# Patient Record
Sex: MALE | Race: WHITE | Employment: FULL TIME | URBAN - METROPOLITAN AREA
[De-identification: names, ages, dates, MRNs, and addresses within clinical notes are randomized per-mention and may not be internally consistent; named-entity substitution may affect disease eponyms.]

---

## 2020-10-09 ENCOUNTER — TRANSFERRED RECORDS (OUTPATIENT)
Dept: HEALTH INFORMATION MANAGEMENT | Facility: CLINIC | Age: 55
End: 2020-10-09

## 2021-04-26 ENCOUNTER — VIRTUAL VISIT (OUTPATIENT)
Dept: FAMILY MEDICINE | Facility: CLINIC | Age: 56
End: 2021-04-26
Payer: COMMERCIAL

## 2021-04-26 DIAGNOSIS — Z86.0100 HISTORY OF COLONIC POLYPS: ICD-10-CM

## 2021-04-26 DIAGNOSIS — Z86.718 PERSONAL HISTORY OF DVT (DEEP VEIN THROMBOSIS): Primary | ICD-10-CM

## 2021-04-26 DIAGNOSIS — Z86.711 HISTORY OF PULMONARY EMBOLISM: ICD-10-CM

## 2021-04-26 PROCEDURE — 99203 OFFICE O/P NEW LOW 30 MIN: CPT | Mod: TEL | Performed by: FAMILY MEDICINE

## 2021-04-26 RX ORDER — MULTIVITAMIN/IRON/FOLIC ACID 18MG-0.4MG
1 TABLET ORAL
COMMUNITY

## 2021-04-26 RX ORDER — GINSENG 250 MG
1 CAPSULE ORAL
COMMUNITY
End: 2022-10-18

## 2021-04-26 RX ORDER — WARFARIN SODIUM 2.5 MG/1
TABLET ORAL
COMMUNITY
Start: 2020-07-13 | End: 2021-06-15

## 2021-04-26 NOTE — PROGRESS NOTES
Vitor is a 55 year old who is being evaluated via a billable telephone visit.      What phone number would you like to be contacted at? [phone]  How would you like to obtain your AVS? Mail a copy    Assessment & Plan       ICD-10-CM    1. Personal history of DVT (deep vein thrombosis)  Z86.718 ANTICOAGULATION CLINIC REFERRAL   2. History of pulmonary embolism  Z86.711 ANTICOAGULATION CLINIC REFERRAL   3. History of colonic polyps  Z86.010      We will get him plugged into our INR clinic for ongoing INR monitoring  Continue same warfarin for now  I suggested an in person visit in a few months or so for a general physical and some fasting lab work (it looks like his last physical was in November 2019)    Return in about 3 months (around 7/26/2021) for Physical Exam, Lab Work, Routine Visit.    Odilon Corral MD  [facility]    Jose Adler is a 55 year old who presents for the following health issues :    HPI     New Patient/Transfer of Care.    He has been receiving care through the [facility] system, but would like to start coming here to our clinic.  His previous physician left and he knows some people who come here to our clinic and he would like to start fresh here.  He has a history of a left leg DVT in August 2015.  He was on warfarin for a while, but then stopped it.  He then developed a pulmonary embolism in March 2017 and was advised to go on lifelong anticoagulation.  He is taking warfarin as below and his INRs have generally been in the 2-3 range.  He does have a history of colon polyps and had a colonoscopy in October 2020 which was normal.  A repeat exam was recommended in 5 years given his history of adenomatous colon polyp.    Patient Active Problem List   Diagnosis     Personal history of DVT (deep vein thrombosis)     History of pulmonary embolism     History of colonic polyps     Current Outpatient Medications   Medication     garlic 150 MG TABS tablet      Frisian Ginseng 250 MG CAPS     warfarin ANTICOAGULANT (COUMADIN) 2.5 MG tablet     Multiple Vitamins-Minerals (CENTRUM ADULTS) TABS     No current facility-administered medications for this visit.          Review of Systems   Constitutional, HEENT, cardiovascular, pulmonary, gi and gu systems are negative, except as otherwise noted.      Objective           Vitals:  No vitals were obtained today due to virtual visit.    Physical Exam       PSYCH: Alert and oriented times 3; coherent speech, normal   rate and volume, able to articulate logical thoughts, able   to abstract reason, no tangential thoughts, no hallucinations   or delusions  His affect is normal  RESP: No cough, no audible wheezing, able to talk in full sentences  Remainder of exam unable to be completed due to telephone visits    Old records were reviewed via Care Everywhere.            Phone call duration: 11 minutes

## 2021-04-27 ENCOUNTER — ANTICOAGULATION THERAPY VISIT (OUTPATIENT)
Dept: FAMILY MEDICINE | Facility: CLINIC | Age: 56
End: 2021-04-27

## 2021-04-27 DIAGNOSIS — Z86.718 PERSONAL HISTORY OF DVT (DEEP VEIN THROMBOSIS): ICD-10-CM

## 2021-04-27 DIAGNOSIS — Z86.711 HISTORY OF PULMONARY EMBOLISM: ICD-10-CM

## 2021-04-27 NOTE — PROGRESS NOTES
Called to establish patient he has been on coumadin for 5 years changing systems. Reviewed our policy with him and phone numbers given.  Mohamud Pate Rn  [facility]

## 2021-05-25 ENCOUNTER — TELEPHONE (OUTPATIENT)
Dept: FAMILY MEDICINE | Facility: CLINIC | Age: 56
End: 2021-05-25

## 2021-05-25 NOTE — TELEPHONE ENCOUNTER
ANTICOAGULATION     Benedict Maldonado is overdue for INR check.      Spoke with benedict  and scheduled lab appointment on 6/2    Angela Silveira RN

## 2021-06-01 ENCOUNTER — ANTICOAGULATION THERAPY VISIT (OUTPATIENT)
Dept: FAMILY MEDICINE | Facility: CLINIC | Age: 56
End: 2021-06-01

## 2021-06-01 DIAGNOSIS — Z86.711 HISTORY OF PULMONARY EMBOLISM: ICD-10-CM

## 2021-06-01 DIAGNOSIS — Z86.718 PERSONAL HISTORY OF DVT (DEEP VEIN THROMBOSIS): ICD-10-CM

## 2021-06-01 LAB
CAPILLARY BLOOD COLLECTION: NORMAL
INR PPP: 2.8 (ref 0.86–1.14)

## 2021-06-01 PROCEDURE — 36416 COLLJ CAPILLARY BLOOD SPEC: CPT | Performed by: FAMILY MEDICINE

## 2021-06-01 PROCEDURE — 85610 PROTHROMBIN TIME: CPT | Performed by: FAMILY MEDICINE

## 2021-06-01 NOTE — PROGRESS NOTES
Anticoagulation Management    Unable to reach Vitor today.    Today's INR result of 2.8 is therapeutic (goal INR of 2.0-3.0).  Result received from: Clinic Lab    Follow up required to assess for changes     left message with dosign and instructions to call and make an inr apt in 6 weeks.      Anticoagulation clinic to follow up    Angela Silveira RN

## 2021-06-15 DIAGNOSIS — Z86.711 HISTORY OF PULMONARY EMBOLISM: Primary | ICD-10-CM

## 2021-06-15 RX ORDER — WARFARIN SODIUM 2.5 MG/1
TABLET ORAL
Qty: 110 TABLET | Refills: 1 | Status: SHIPPED | OUTPATIENT
Start: 2021-06-15 | End: 2021-11-16

## 2021-06-15 NOTE — TELEPHONE ENCOUNTER
Patient calling for a refill.     Prescription approved per [facility] Refill Protocol.    Francisca Godwin RN  [facility] Anticoagulation [facility]

## 2021-07-23 DIAGNOSIS — Z86.718 PERSONAL HISTORY OF DVT (DEEP VEIN THROMBOSIS): Primary | ICD-10-CM

## 2021-07-23 DIAGNOSIS — Z86.711 HISTORY OF PULMONARY EMBOLISM: ICD-10-CM

## 2021-07-26 ENCOUNTER — ANTICOAGULATION THERAPY VISIT (OUTPATIENT)
Dept: FAMILY MEDICINE | Facility: CLINIC | Age: 56
End: 2021-07-26

## 2021-07-26 ENCOUNTER — LAB (OUTPATIENT)
Dept: LAB | Facility: CLINIC | Age: 56
End: 2021-07-26
Payer: COMMERCIAL

## 2021-07-26 DIAGNOSIS — Z86.711 HISTORY OF PULMONARY EMBOLISM: ICD-10-CM

## 2021-07-26 DIAGNOSIS — Z86.718 PERSONAL HISTORY OF DVT (DEEP VEIN THROMBOSIS): ICD-10-CM

## 2021-07-26 DIAGNOSIS — Z86.718 PERSONAL HISTORY OF DVT (DEEP VEIN THROMBOSIS): Primary | ICD-10-CM

## 2021-07-26 LAB — INR BLD: 2.4 (ref 0.9–1.1)

## 2021-07-26 PROCEDURE — 36416 COLLJ CAPILLARY BLOOD SPEC: CPT

## 2021-07-26 PROCEDURE — 85610 PROTHROMBIN TIME: CPT

## 2021-07-26 NOTE — PROGRESS NOTES
ANTICOAGULATION MANAGEMENT     Vitor SANDERSON Erica 55 year old male is on warfarin with therapeutic INR result. (Goal INR 2.0-3.0)    Recent labs: (last 7 days)     07/26/21  1624   INR 2.4*       ASSESSMENT     Source(s): Chart Review and Patient/Caregiver Call       Warfarin doses taken: Warfarin taken as instructed    Diet: No new diet changes identified    New illness, injury, or hospitalization: No    Medication/supplement changes: None noted    Signs or symptoms of bleeding or clotting: No    Previous INR: Therapeutic last 2(+) visits    Additional findings: None     PLAN     Recommended plan for no diet, medication or health factor changes affecting INR     Dosing Instructions: Continue your current warfarin dose with next INR in 6 weeks       Summary  As of 7/26/2021    Full warfarin instructions:  5 mg every Sun, Wed; 2.5 mg all other days   Next INR check:  8/23/2021             Telephone call with Vitor who verbalizes understanding and agrees to plan    Lab visit scheduled    Education provided: None required and Please call back if any changes to your diet, medications or how you've been taking warfarin    Plan made per [facility] anticoagulation protocol    Angela Silveira RN  Anticoagulation Clinic  7/26/2021    _______________________________________________________________________     Anticoagulation Episode Summary     Current INR goal:  2.0-3.0   TTR:  100.0 % (1.8 mo)   Target end date:  1/1/2070   Send INR reminders to:  JESSICA TAPIA    Indications    Personal history of DVT (deep vein thrombosis) [Z86.718]  History of pulmonary embolism [Z86.711]           Comments:           Anticoagulation Care Providers     Provider Role Specialty Phone number    Odilon Corral MD Referring Family Medicine [phone]

## 2021-09-16 ENCOUNTER — ANTICOAGULATION THERAPY VISIT (OUTPATIENT)
Dept: ANTICOAGULATION | Facility: CLINIC | Age: 56
End: 2021-09-16

## 2021-09-16 ENCOUNTER — LAB (OUTPATIENT)
Dept: LAB | Facility: CLINIC | Age: 56
End: 2021-09-16
Payer: COMMERCIAL

## 2021-09-16 DIAGNOSIS — Z86.711 HISTORY OF PULMONARY EMBOLISM: ICD-10-CM

## 2021-09-16 DIAGNOSIS — Z86.718 PERSONAL HISTORY OF DVT (DEEP VEIN THROMBOSIS): ICD-10-CM

## 2021-09-16 DIAGNOSIS — Z86.718 PERSONAL HISTORY OF DVT (DEEP VEIN THROMBOSIS): Primary | ICD-10-CM

## 2021-09-16 LAB — INR BLD: 2.4 (ref 0.9–1.1)

## 2021-09-16 PROCEDURE — 36416 COLLJ CAPILLARY BLOOD SPEC: CPT

## 2021-09-16 PROCEDURE — 85610 PROTHROMBIN TIME: CPT

## 2021-09-16 NOTE — PROGRESS NOTES
Anticoagulation Management    Unable to reach Vitor today.    Today's INR result of 2.4 is therapeutic (goal INR of 2.0-3.0).  Result received from: Clinic Lab    Follow up required to confirm warfarin dose taken and assess for changes    Left message to continue current dose of warfarin 2.5 mg tonight.     Plan: continue maintenance dose and recheck INR in 6 weeks      Anticoagulation clinic to follow up    Portia Mackey RN

## 2021-09-17 NOTE — PROGRESS NOTES
ANTICOAGULATION MANAGEMENT     Vitor SANDERSON Tabbynasim 55 year old male is on warfarin with therapeutic INR result. (Goal INR 2.0-3.0)    Recent labs: (last 7 days)     09/16/21  1537   INR 2.4*       ASSESSMENT     Source(s): Chart Review       Warfarin doses taken: Warfarin taken as instructed    Diet: No new diet changes identified    New illness, injury, or hospitalization: No    Medication/supplement changes: None noted    Signs or symptoms of bleeding or clotting: No    Previous INR: Therapeutic last 2(+) visits    Additional findings: None     PLAN     Recommended plan for no diet, medication or health factor changes affecting INR     Dosing Instructions: Continue your current warfarin dose with next INR in 4 weeks       Summary  As of 9/16/2021    Full warfarin instructions:  5 mg every Sun, Wed; 2.5 mg all other days   Next INR check:  10/28/2021             Telephone call with Vitor who verbalizes understanding and agrees to plan    Lab visit scheduled    Education provided: Please call back if any changes to your diet, medications or how you've been taking warfarin    Plan made per [facility] anticoagulation protocol    Angela Silveira RN  Anticoagulation Clinic  9/17/2021    _______________________________________________________________________     Anticoagulation Episode Summary     Current INR goal:  2.0-3.0   TTR:  100.0 % (3.5 mo)   Target end date:  1/1/2070   Send INR reminders to:  JESSICA TAPIA    Indications    Personal history of DVT (deep vein thrombosis) [Z86.718]  History of pulmonary embolism [Z86.711]           Comments:           Anticoagulation Care Providers     Provider Role Specialty Phone number    Odilon Corral MD Referring Family Medicine [phone]

## 2021-10-22 ENCOUNTER — OFFICE VISIT (OUTPATIENT)
Dept: OPTOMETRY | Facility: CLINIC | Age: 56
End: 2021-10-22
Payer: COMMERCIAL

## 2021-10-22 DIAGNOSIS — H52.31 ANISOMETROPIA: ICD-10-CM

## 2021-10-22 DIAGNOSIS — Z01.01 ENCOUNTER FOR EXAMINATION OF EYES AND VISION WITH ABNORMAL FINDINGS: Primary | ICD-10-CM

## 2021-10-22 DIAGNOSIS — H52.4 PRESBYOPIA: ICD-10-CM

## 2021-10-22 DIAGNOSIS — H53.021 REFRACTIVE AMBLYOPIA OF RIGHT EYE: ICD-10-CM

## 2021-10-22 DIAGNOSIS — H52.223 REGULAR ASTIGMATISM OF BOTH EYES: ICD-10-CM

## 2021-10-22 DIAGNOSIS — H43.811 PVD (POSTERIOR VITREOUS DETACHMENT), RIGHT: ICD-10-CM

## 2021-10-22 DIAGNOSIS — H52.13 MYOPIA OF BOTH EYES: ICD-10-CM

## 2021-10-22 PROCEDURE — 92015 DETERMINE REFRACTIVE STATE: CPT | Performed by: OPTOMETRIST

## 2021-10-22 PROCEDURE — 92004 COMPRE OPH EXAM NEW PT 1/>: CPT | Performed by: OPTOMETRIST

## 2021-10-22 ASSESSMENT — VISUAL ACUITY
OS_CC: 20/20
OS_SC: 20/30
OS_CC+: -1
METHOD: SNELLEN - LINEAR
OD_SC: 20/30
CORRECTION_TYPE: GLASSES
OS_SC: 20/70
OD_PH_CC: 20/80
OD_SC: 20/80
OD_CC: 20/125

## 2021-10-22 ASSESSMENT — TONOMETRY
IOP_METHOD: APPLANATION
OS_IOP_MMHG: 13
OD_IOP_MMHG: 12

## 2021-10-22 ASSESSMENT — CONF VISUAL FIELD
OD_NORMAL: 1
OS_NORMAL: 1

## 2021-10-22 ASSESSMENT — REFRACTION_MANIFEST
OD_AXIS: 071
OS_ADD: +2.50
OS_SPHERE: -2.25
OS_AXIS: 145
OD_SPHERE: -5.25
OD_ADD: +2.50
OD_CYLINDER: +2.50
OS_CYLINDER: +0.50

## 2021-10-22 ASSESSMENT — CUP TO DISC RATIO
OD_RATIO: 0.35
OS_RATIO: 0.4

## 2021-10-22 ASSESSMENT — REFRACTION_WEARINGRX
OS_AXIS: 110
OS_SPHERE: -2.50
OS_CYLINDER: +1.00
OD_SPHERE: -6.25
OD_CYLINDER: +2.50
SPECS_TYPE: SVL
OD_AXIS: 060

## 2021-10-22 ASSESSMENT — EXTERNAL EXAM - LEFT EYE: OS_EXAM: NORMAL

## 2021-10-22 ASSESSMENT — SLIT LAMP EXAM - LIDS
COMMENTS: NORMAL
COMMENTS: NORMAL

## 2021-10-22 ASSESSMENT — EXTERNAL EXAM - RIGHT EYE: OD_EXAM: NORMAL

## 2021-10-22 NOTE — PATIENT INSTRUCTIONS
"You have a PVD- posterior vitreous detachment which is due to the gel of the eye shrinking and clumping together.  This can sometimes cause holes or tears in the retina.  The signs of a retinal detachment are flashes of light or a \"curtain veil\" coming over your vision. If you notice any of these changes return to clinic for re-evaluation.     Distance glasses prescription provided today, per patient request.   Allow 2 weeks to adapt to the new glasses.     Return in 1 year for a comprehensive eye exam, or sooner if needed.     The effects of the dilating drops last for 4- 6 hours.  You will be more sensitive to light and vision will be blurry up close.  Mydriatic sunglasses were given if needed.    Guillermo Quinonez, OD  [address] [place]  [address]    [phone]    "

## 2021-10-22 NOTE — PROGRESS NOTES
"Chief Complaint   Patient presents with     Annual Eye Exam         Last Eye Exam: 2017  Dilated Previously: Yes, side effects of dilation explained today    What are you currently using to see?  glasses       Distance Vision Acuity: Satisfied with vision    Near Vision Acuity: Satisfied with vision while reading and using computer unaided    Eye Comfort: good  Do you use eye drops? 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       OBJECTIVE: See Ophthalmology exam    ASSESSMENT:    ICD-10-CM    1. Encounter for examination of eyes and vision with abnormal findings  Z01.01    2. Refractive amblyopia of right eye  H53.021    3. Anisometropia  H52.31    4. PVD (posterior vitreous detachment), right  H43.811    5. Myopia of both eyes  H52.13    6. Regular astigmatism of both eyes  H52.223    7. Presbyopia  H52.4       PLAN:     Patient Instructions   You have a PVD- posterior vitreous detachment which is due to the gel of the eye shrinking and clumping together.  This can sometimes cause holes or tears in the retina.  The signs of a retinal detachment are flashes of light or a \"curtain veil\" coming over your vision. If you notice any of these changes return to clinic for re-evaluation.     Distance glasses prescription provided today, per patient request.   Allow 2 weeks to adapt to the new glasses.     Return in 1 year for a comprehensive eye exam, or sooner if needed.     The effects of the dilating drops last for 4- 6 hours.  You will be more sensitive to light and vision will be blurry up close.  Mydriatic sunglasses were given if needed.    Guillermo Quinonez, [address] [place]  [address]    [phone]       "

## 2021-10-22 NOTE — LETTER
"    10/22/2021         RE: Vitor Maldonado  [address]  [place] [address]        Dear Colleague,    Thank you for referring your patient, Vitor Maldonado, to the [facility]. Please see a copy of my visit note below.    Chief Complaint   Patient presents with     Annual Eye Exam         Last Eye Exam: 2017  Dilated Previously: Yes, side effects of dilation explained today    What are you currently using to see?  glasses       Distance Vision Acuity: Satisfied with vision    Near Vision Acuity: Satisfied with vision while reading and using computer unaided    Eye Comfort: good  Do you use eye drops? 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       OBJECTIVE: See Ophthalmology exam    ASSESSMENT:    ICD-10-CM    1. Encounter for examination of eyes and vision with abnormal findings  Z01.01    2. Refractive amblyopia of right eye  H53.021    3. Anisometropia  H52.31    4. PVD (posterior vitreous detachment), right  H43.811    5. Myopia of both eyes  H52.13    6. Regular astigmatism of both eyes  H52.223    7. Presbyopia  H52.4       PLAN:     Patient Instructions   You have a PVD- posterior vitreous detachment which is due to the gel of the eye shrinking and clumping together.  This can sometimes cause holes or tears in the retina.  The signs of a retinal detachment are flashes of light or a \"curtain veil\" coming over your vision. If you notice any of these changes return to clinic for re-evaluation.     Distance glasses prescription provided today, per patient request.   Allow 2 weeks to adapt to the new glasses.     Return in 1 year for a comprehensive eye exam, or sooner if needed.     The effects of the dilating drops last for 4- 6 hours.  You will be more sensitive to light and vision will be blurry up close.  Mydriatic sunglasses were given if needed.    Guillermo Quinonez, " ERICKA  [facility]  [address]. [place]  [address]    [phone]           Again, thank you for allowing me to participate in the care of your patient.        Sincerely,        Guillermo Quinonez OD

## 2021-11-09 ENCOUNTER — LAB (OUTPATIENT)
Dept: LAB | Facility: CLINIC | Age: 56
End: 2021-11-09
Payer: COMMERCIAL

## 2021-11-09 ENCOUNTER — ANTICOAGULATION THERAPY VISIT (OUTPATIENT)
Dept: ANTICOAGULATION | Facility: CLINIC | Age: 56
End: 2021-11-09

## 2021-11-09 DIAGNOSIS — Z86.718 PERSONAL HISTORY OF DVT (DEEP VEIN THROMBOSIS): Primary | ICD-10-CM

## 2021-11-09 DIAGNOSIS — Z86.711 HISTORY OF PULMONARY EMBOLISM: ICD-10-CM

## 2021-11-09 DIAGNOSIS — Z86.718 PERSONAL HISTORY OF DVT (DEEP VEIN THROMBOSIS): ICD-10-CM

## 2021-11-09 LAB — INR BLD: 3 (ref 0.9–1.1)

## 2021-11-09 PROCEDURE — 85610 PROTHROMBIN TIME: CPT

## 2021-11-09 PROCEDURE — 36415 COLL VENOUS BLD VENIPUNCTURE: CPT

## 2021-11-09 NOTE — PROGRESS NOTES
ANTICOAGULATION MANAGEMENT     Vitor Maldonado 56 year old male is on warfarin with therapeutic INR result. (Goal INR 2.0-3.0)    Recent labs: (last 7 days)     11/09/21  1506   INR 3.0*       ASSESSMENT     Source(s): Chart Review and Patient/Caregiver Call       Warfarin doses taken: Warfarin taken as instructed    Diet: No new diet changes identified    New illness, injury, or hospitalization: Yes: back pain from yard work    Medication/supplement changes: ibuprofen for back pain    Signs or symptoms of bleeding or clotting: No    Previous INR: Therapeutic last 2(+) visits    Additional findings: None     PLAN     Recommended plan for temporary change(s) affecting INR     Dosing Instructions: Continue your current warfarin dose with next INR in 6 weeks       Summary  As of 11/9/2021    Full warfarin instructions:  5 mg every Sun, Wed; 2.5 mg all other days   Next INR check:  12/21/2021             Telephone call with Vitor who verbalizes understanding and agrees to plan    Patient offered & declined to schedule next visit    Education provided: Please call back if any changes to your diet, medications or how you've been taking warfarin, Potential interaction between warfarin and ibuprofen with increased bleed risk, Monitoring for bleeding signs and symptoms and Contact [phone]  with any changes, questions or concerns.     Plan made per [facility] anticoagulation protocol    Delmi Calderon RN  Anticoagulation Clinic  11/9/2021    _______________________________________________________________________     Anticoagulation Episode Summary     Current INR goal:  2.0-3.0   TTR:  100.0 % (5.3 mo)   Target end date:  1/1/2070   Send INR reminders to:  JESSICA TAPIA    Indications    Personal history of DVT (deep vein thrombosis) [Z86.879]  History of pulmonary embolism [Z86.161]           Comments:           Anticoagulation Care Providers     Provider Role Specialty Phone number    Odilon Corral MD Referring Family  [facility] [phone]

## 2021-11-15 DIAGNOSIS — Z86.711 HISTORY OF PULMONARY EMBOLISM: ICD-10-CM

## 2021-11-16 RX ORDER — WARFARIN SODIUM 2.5 MG/1
TABLET ORAL
Qty: 110 TABLET | Refills: 3 | Status: SHIPPED | OUTPATIENT
Start: 2021-11-16 | End: 2022-10-20

## 2021-11-16 NOTE — TELEPHONE ENCOUNTER
Prescription approved per [facility] Refill Protocol.  Mohamud Pate Rn  [facility]

## 2022-01-11 ENCOUNTER — LAB (OUTPATIENT)
Dept: LAB | Facility: CLINIC | Age: 57
End: 2022-01-11
Payer: COMMERCIAL

## 2022-01-11 ENCOUNTER — IMMUNIZATION (OUTPATIENT)
Dept: NURSING | Facility: CLINIC | Age: 57
End: 2022-01-11
Payer: COMMERCIAL

## 2022-01-11 ENCOUNTER — ANTICOAGULATION THERAPY VISIT (OUTPATIENT)
Dept: ANTICOAGULATION | Facility: CLINIC | Age: 57
End: 2022-01-11

## 2022-01-11 DIAGNOSIS — Z86.718 PERSONAL HISTORY OF DVT (DEEP VEIN THROMBOSIS): ICD-10-CM

## 2022-01-11 DIAGNOSIS — Z23 HIGH PRIORITY FOR 2019-NCOV VACCINE: Primary | ICD-10-CM

## 2022-01-11 DIAGNOSIS — Z86.718 PERSONAL HISTORY OF DVT (DEEP VEIN THROMBOSIS): Primary | ICD-10-CM

## 2022-01-11 DIAGNOSIS — Z86.711 HISTORY OF PULMONARY EMBOLISM: ICD-10-CM

## 2022-01-11 LAB — INR BLD: 3.2 (ref 0.9–1.1)

## 2022-01-11 PROCEDURE — 91306 COVID-19,PF,MODERNA (18+ YRS BOOSTER .25ML): CPT

## 2022-01-11 PROCEDURE — 36416 COLLJ CAPILLARY BLOOD SPEC: CPT

## 2022-01-11 PROCEDURE — 99207 PR NO CHARGE LOS: CPT

## 2022-01-11 PROCEDURE — 0064A COVID-19,PF,MODERNA (18+ YRS BOOSTER .25ML): CPT

## 2022-01-11 PROCEDURE — 85610 PROTHROMBIN TIME: CPT

## 2022-01-11 NOTE — PROGRESS NOTES
Anticoagulation Management    Unable to reach Vitor today.    Today's INR result of 3.2 is supratherapeutic (goal INR of 2.0-3.0).  Result received from: Clinic Lab    Follow up required to confirm warfarin dose taken and assess for changes and discuss out of range INR     Left message to continue current dose of warfarin 2.5 mg tonight. Request call back for assessment.      Anticoagulation clinic to follow up    Portia Mackey RN

## 2022-01-12 ENCOUNTER — TELEPHONE (OUTPATIENT)
Dept: FAMILY MEDICINE | Facility: CLINIC | Age: 57
End: 2022-01-12
Payer: COMMERCIAL

## 2022-01-12 NOTE — PROGRESS NOTES
Anticoagulation Management    [facility]      Anticoagulation clinic to follow up    Portia Mackey RN

## 2022-01-12 NOTE — TELEPHONE ENCOUNTER
Reason for Call:  Other call back    Detailed comments: INR Callback about results     Phone Number Patient can be reached at: Cell number on file:    Telephone Information:   Mobile [phone]       Best Time: Anytime    Can we leave a detailed message on this number? YES    Call taken on 1/12/2022 at 8:25 AM by Lynne Morales

## 2022-01-12 NOTE — PROGRESS NOTES
ANTICOAGULATION MANAGEMENT     Vitor Maldonado 56 year old male is on warfarin with supratherapeutic INR result. (Goal INR 2.0-3.0)    Recent labs: (last 7 days)     01/11/22  1536   INR 3.2*       ASSESSMENT     Source(s): Chart Review and Patient/Caregiver Call       Warfarin doses taken: Warfarin taken as instructed    Diet: No new diet changes identified    New illness, injury, or hospitalization: Yes: just recovering from COVID    Medication/supplement changes: taking IBU    Signs or symptoms of bleeding or clotting: No    Previous INR: Therapeutic last 2(+) visits    Additional findings: None     PLAN     Recommended plan for no diet, medication or health factor changes affecting INR     Dosing Instructions: Continue your current warfarin dose with next INR in 2 weeks       Summary  As of 1/11/2022    Full warfarin instructions:  5 mg every Sun, Wed; 2.5 mg all other days   Next INR check:  1/25/2022             Telephone call with Vitor who verbalizes understanding and agrees to plan    Patient offered & declined to schedule next visit    Education provided: Goal range and significance of current result    Plan made per [facility] anticoagulation protocol    Portia Mackey RN  Anticoagulation Clinic  1/12/2022    _______________________________________________________________________     Anticoagulation Episode Summary     Current INR goal:  2.0-3.0   TTR:  71.9 % (7.4 mo)   Target end date:  1/1/2070   Send INR reminders to:  JESSICA TAPIA    Indications    Personal history of DVT (deep vein thrombosis) [Z86.718]  History of pulmonary embolism [Z86.711]           Comments:           Anticoagulation Care Providers     Provider Role Specialty Phone number    Odilon Corral MD Referring Family Medicine [phone]

## 2022-02-01 ENCOUNTER — TELEPHONE (OUTPATIENT)
Dept: FAMILY MEDICINE | Facility: CLINIC | Age: 57
End: 2022-02-01
Payer: COMMERCIAL

## 2022-02-01 NOTE — TELEPHONE ENCOUNTER
Patient returned call and said that he has an appointment now scheduled for 2/9/22.    Candido Freire RN

## 2022-02-01 NOTE — TELEPHONE ENCOUNTER
ANTICOAGULATION     Vitor Maldonado is overdue for INR check.      Left message for patient to call and schedule lab appointment as soon as possible. If returning call, please schedule.     Portia Mackey RN

## 2022-02-09 ENCOUNTER — LAB (OUTPATIENT)
Dept: LAB | Facility: CLINIC | Age: 57
End: 2022-02-09
Payer: COMMERCIAL

## 2022-02-09 ENCOUNTER — ANTICOAGULATION THERAPY VISIT (OUTPATIENT)
Dept: ANTICOAGULATION | Facility: CLINIC | Age: 57
End: 2022-02-09

## 2022-02-09 DIAGNOSIS — Z86.711 HISTORY OF PULMONARY EMBOLISM: ICD-10-CM

## 2022-02-09 DIAGNOSIS — Z86.718 PERSONAL HISTORY OF DVT (DEEP VEIN THROMBOSIS): Primary | ICD-10-CM

## 2022-02-09 DIAGNOSIS — Z86.718 PERSONAL HISTORY OF DVT (DEEP VEIN THROMBOSIS): ICD-10-CM

## 2022-02-09 LAB — INR BLD: 2.9 (ref 0.9–1.1)

## 2022-02-09 PROCEDURE — 85610 PROTHROMBIN TIME: CPT

## 2022-02-09 PROCEDURE — 36415 COLL VENOUS BLD VENIPUNCTURE: CPT

## 2022-02-09 NOTE — PROGRESS NOTES
Anticoagulation Management    Unable to reach patient today.    Today's INR result of 2.9 is therapeutic (goal INR of 2.0-3.0).  Result received from: Clinic Lab    Follow up required to confirm warfarin dose taken and assess for changes    Left message to continue current dose of warfarin 5 mg tonight. Request call back for assessment.      Anticoagulation clinic to follow up    Portia Mackey RN

## 2022-02-10 ENCOUNTER — TELEPHONE (OUTPATIENT)
Dept: FAMILY MEDICINE | Facility: CLINIC | Age: 57
End: 2022-02-10
Payer: COMMERCIAL

## 2022-02-10 NOTE — PROGRESS NOTES
ANTICOAGULATION MANAGEMENT     Vitor Maldonado 56 year old male is on warfarin with therapeutic INR result. (Goal INR 2.0-3.0)    Recent labs: (last 7 days)     02/09/22  1556   INR 2.9*       ASSESSMENT     Source(s): Chart Review and Patient/Caregiver Call       Warfarin doses taken: Warfarin taken as instructed    Diet: No new diet changes identified    New illness, injury, or hospitalization: No    Medication/supplement changes: None noted    Signs or symptoms of bleeding or clotting: No    Previous INR: Therapeutic last visit; previously outside of goal range    Additional findings: None     PLAN     Recommended plan for no diet, medication or health factor changes affecting INR     Dosing Instructions: Continue your current warfarin dose with next INR in 4 weeks       Summary  As of 2/9/2022    Full warfarin instructions:  5 mg every Sun, Wed; 2.5 mg all other days   Next INR check:  3/9/2022             Telephone call with Vitor who verbalizes understanding and agrees to plan    Lab visit scheduled    Education provided: Monitoring for bleeding signs and symptoms and Monitoring for clotting signs and symptoms    Plan made per [facility] anticoagulation protocol    Francisca Godwin RN  Anticoagulation Clinic  2/10/2022    _______________________________________________________________________     Anticoagulation Episode Summary     Current INR goal:  2.0-3.0   TTR:  67.4 % (8.4 mo)   Target end date:  1/1/2070   Send INR reminders to:  JESSICA TAPIA    Indications    Personal history of DVT (deep vein thrombosis) [Z86.718]  History of pulmonary embolism [Z86.711]           Comments:           Anticoagulation Care Providers     Provider Role Specialty Phone number    Odilon Corral MD Referring Family Medicine [phone]

## 2022-02-10 NOTE — TELEPHONE ENCOUNTER
See [facility] encounter from 2/9    Francisca Godwin RN  [facility] Anticoagulation [facility]

## 2022-02-10 NOTE — TELEPHONE ENCOUNTER
Reason for Call:  Other returning call    Detailed comments: patient returning ACN call- writer accidentally cold transferred to Portia's phone line     Phone Number Patient can be reached at: Cell number on file:    Telephone Information:   Mobile [phone]       Best Time: any    Can we leave a detailed message on this number? YES    Call taken on 2/10/2022 at 8:58 AM by Jen Tejada

## 2022-03-09 ENCOUNTER — ANTICOAGULATION THERAPY VISIT (OUTPATIENT)
Dept: ANTICOAGULATION | Facility: CLINIC | Age: 57
End: 2022-03-09

## 2022-03-09 ENCOUNTER — LAB (OUTPATIENT)
Dept: LAB | Facility: CLINIC | Age: 57
End: 2022-03-09
Payer: COMMERCIAL

## 2022-03-09 DIAGNOSIS — Z86.718 PERSONAL HISTORY OF DVT (DEEP VEIN THROMBOSIS): Primary | ICD-10-CM

## 2022-03-09 DIAGNOSIS — Z86.711 HISTORY OF PULMONARY EMBOLISM: ICD-10-CM

## 2022-03-09 DIAGNOSIS — Z86.718 PERSONAL HISTORY OF DVT (DEEP VEIN THROMBOSIS): ICD-10-CM

## 2022-03-09 LAB — INR BLD: 2 (ref 0.9–1.1)

## 2022-03-09 PROCEDURE — 36416 COLLJ CAPILLARY BLOOD SPEC: CPT

## 2022-03-09 PROCEDURE — 85610 PROTHROMBIN TIME: CPT

## 2022-03-09 NOTE — PROGRESS NOTES
ANTICOAGULATION MANAGEMENT     Vitor Maldonado 56 year old male is on warfarin with therapeutic INR result. (Goal INR 2.0-3.0)    Recent labs: (last 7 days)     03/09/22  1547   INR 2.0*       ASSESSMENT       Source(s): Chart Review    Previous INR was Therapeutic last 2(+) visits    Medication, diet, health changes since last INR chart reviewed; none identified           PLAN     Unable to reach patient today.    Left message to continue current dose of warfarin 5 mg tonight. Request call back for assessment.    Follow up required to confirm warfarin dose taken and assess for changes    Portia Mackey RN  Anticoagulation Clinic  3/9/2022

## 2022-03-09 NOTE — PROGRESS NOTES
"Central scheduling left a VM on the Home Care line at 5:37 pm stating patient is calling \"gisela\" back.  Please call patient.  Thank you.  Martha Dimas, RN  Anticoagulation Nurse - [facility], [place]    "

## 2022-03-09 NOTE — PROGRESS NOTES
ANTICOAGULATION MANAGEMENT     Vitor Maldonado 56 year old male is on warfarin with therapeutic INR result. (Goal INR 2.0-3.0)    Recent labs: (last 7 days)     03/09/22  1547   INR 2.0*       ASSESSMENT       Source(s): Chart Review and Patient/Caregiver Call       Warfarin doses taken: Warfarin taken as instructed    Diet: No new diet changes identified    New illness, injury, or hospitalization: No    Medication/supplement changes: None noted    Signs or symptoms of bleeding or clotting: No    Previous INR: Therapeutic last 2(+) visits    Additional findings: None       PLAN     Recommended plan for no diet, medication or health factor changes affecting INR     Dosing Instructions: Continue your current warfarin dose with next INR in 5 weeks       Summary  As of 3/9/2022    Full warfarin instructions:  5 mg every Sun, Wed; 2.5 mg all other days   Next INR check:  4/13/2022             Telephone call with Vitor who verbalizes understanding and agrees to plan    Patient offered & declined to schedule next visit    Education provided: Goal range and significance of current result    Plan made per [facility] anticoagulation protocol    Portia Mackey RN  Anticoagulation Clinic  3/9/2022    _______________________________________________________________________     Anticoagulation Episode Summary     Current INR goal:  2.0-3.0   TTR:  70.7 % (9.3 mo)   Target end date:  1/1/2070   Send INR reminders to:  JESSICA TAPIA    Indications    Personal history of DVT (deep vein thrombosis) [Z86.718]  History of pulmonary embolism [Z86.711]           Comments:           Anticoagulation Care Providers     Provider Role Specialty Phone number    Odilon Corral MD Referring Family Medicine [phone]

## 2022-04-20 ENCOUNTER — LAB (OUTPATIENT)
Dept: LAB | Facility: CLINIC | Age: 57
End: 2022-04-20
Payer: COMMERCIAL

## 2022-04-20 ENCOUNTER — ANTICOAGULATION THERAPY VISIT (OUTPATIENT)
Dept: ANTICOAGULATION | Facility: CLINIC | Age: 57
End: 2022-04-20

## 2022-04-20 DIAGNOSIS — Z86.711 HISTORY OF PULMONARY EMBOLISM: ICD-10-CM

## 2022-04-20 DIAGNOSIS — Z86.718 PERSONAL HISTORY OF DVT (DEEP VEIN THROMBOSIS): Primary | ICD-10-CM

## 2022-04-20 DIAGNOSIS — Z86.718 PERSONAL HISTORY OF DVT (DEEP VEIN THROMBOSIS): ICD-10-CM

## 2022-04-20 LAB — INR BLD: 2.4 (ref 0.9–1.1)

## 2022-04-20 PROCEDURE — 36416 COLLJ CAPILLARY BLOOD SPEC: CPT

## 2022-04-20 PROCEDURE — 85610 PROTHROMBIN TIME: CPT

## 2022-04-20 NOTE — PROGRESS NOTES
ANTICOAGULATION MANAGEMENT     Vitor Maldonado 56 year old male is on warfarin with therapeutic INR result. (Goal INR 2.0-3.0)    Recent labs: (last 7 days)     04/20/22  1607   INR 2.4*       ASSESSMENT       Source(s): Chart Review    Previous INR was Therapeutic last 2(+) visits    Medication, diet, health changes since last INR chart reviewed; none identified           PLAN     Recommended plan for no diet, medication or health factor changes affecting INR     Dosing Instructions: continue your current warfarin dose with next INR in 6 weeks       Summary  As of 4/20/2022    Full warfarin instructions:  5 mg every Sun, Wed; 2.5 mg all other days   Next INR check:  6/1/2022             Detailed voice message left for Iftikhar with dosing instructions and follow up date.     Contact [phone]  to schedule and with any changes, questions or concerns.     Education provided: Please call back if any changes to your diet, medications or how you've been taking warfarin and Goal range and significance of current result    Plan made per [facility] anticoagulation protocol    Portia Mackey RN  Anticoagulation Clinic  4/20/2022    _______________________________________________________________________     Anticoagulation Episode Summary     Current INR goal:  2.0-3.0   TTR:  74.5 % (10.8 mo)   Target end date:  1/1/2070   Send INR reminders to:  JESSICA TAPIA    Indications    Personal history of DVT (deep vein thrombosis) [Z86.718]  History of pulmonary embolism [Z86.711]           Comments:           Anticoagulation Care Providers     Provider Role Specialty Phone number    Odilon Corral MD Referring Family Medicine [phone]

## 2022-05-11 ENCOUNTER — TELEPHONE (OUTPATIENT)
Dept: FAMILY MEDICINE | Facility: CLINIC | Age: 57
End: 2022-05-11
Payer: COMMERCIAL

## 2022-05-11 DIAGNOSIS — Z86.718 PERSONAL HISTORY OF DVT (DEEP VEIN THROMBOSIS): ICD-10-CM

## 2022-05-11 DIAGNOSIS — Z86.711 HISTORY OF PULMONARY EMBOLISM: Primary | ICD-10-CM

## 2022-05-11 NOTE — TELEPHONE ENCOUNTER
ANTICOAGULATION MANAGEMENT      Vitor Maldonado due for annual renewal of referral to anticoagulation monitoring. Order pended for your review and signature.      ANTICOAGULATION SUMMARY      Warfarin indication(s)     DVT  PE    Heart valve present?  NO       Current goal range   INR: 2.0-3.0     Goal appropriate for indication? Yes, INR 2-3 appropriate for hx of DVT, PE, hypercoagulable state, Afib, LVAD, or bileaflet AVR without risk factors     Current duration of therapy Indefinite/long term therapy   Time in Therapeutic Range (TTR)  (Goal > 60%) 74.5%       Office visit with referring provider's group within last year no on 4/26/21       Portia Mackey RN

## 2022-05-11 NOTE — TELEPHONE ENCOUNTER
I signed off on the INR orders, but I still have not ever seen this individual.  He was a new patient to our clinic a year ago, but has not ever been in for an office visit with me.  Please encourage him to make an appointment to see me for a general physical at some point.    Odilon Corral MD

## 2022-06-01 ENCOUNTER — TELEPHONE (OUTPATIENT)
Dept: FAMILY MEDICINE | Facility: CLINIC | Age: 57
End: 2022-06-01
Payer: COMMERCIAL

## 2022-06-01 NOTE — TELEPHONE ENCOUNTER
ANTICOAGULATION     Vitor Maldonado is overdue for INR check.      Spoke with patient and scheduled lab appointment on 6/8    Portia Mackey RN

## 2022-06-01 NOTE — TELEPHONE ENCOUNTER
Advised patient of below message    Portia Mackey RN - [facility]tico[facility]

## 2022-06-08 ENCOUNTER — LAB (OUTPATIENT)
Dept: LAB | Facility: CLINIC | Age: 57
End: 2022-06-08
Payer: COMMERCIAL

## 2022-06-08 ENCOUNTER — ANTICOAGULATION THERAPY VISIT (OUTPATIENT)
Dept: ANTICOAGULATION | Facility: CLINIC | Age: 57
End: 2022-06-08

## 2022-06-08 DIAGNOSIS — Z86.718 PERSONAL HISTORY OF DVT (DEEP VEIN THROMBOSIS): Primary | ICD-10-CM

## 2022-06-08 DIAGNOSIS — Z86.718 PERSONAL HISTORY OF DVT (DEEP VEIN THROMBOSIS): ICD-10-CM

## 2022-06-08 DIAGNOSIS — Z86.711 HISTORY OF PULMONARY EMBOLISM: ICD-10-CM

## 2022-06-08 LAB — INR BLD: 2.1 (ref 0.9–1.1)

## 2022-06-08 PROCEDURE — 85610 PROTHROMBIN TIME: CPT

## 2022-06-08 PROCEDURE — 36415 COLL VENOUS BLD VENIPUNCTURE: CPT

## 2022-06-08 NOTE — PROGRESS NOTES
ANTICOAGULATION MANAGEMENT     Vitor Maldonado 56 year old male is on warfarin with therapeutic INR result. (Goal INR 2.0-3.0)    Recent labs: (last 7 days)     06/08/22  1535   INR 2.1*       ASSESSMENT       Source(s): Chart Review    Previous INR was Therapeutic last 2(+) visits    Medication, diet, health changes since last INR chart reviewed; none identified           PLAN     Recommended plan for no diet, medication or health factor changes affecting INR     Dosing Instructions: continue your current warfarin dose with next INR in 6 weeks       Summary  As of 6/8/2022    Full warfarin instructions:  5 mg every Sun, Wed; 2.5 mg all other days   Next INR check:  7/20/2022             Detailed voice message left for Iftikhar with dosing instructions and follow up date.     Contact [phone]  to schedule and with any changes, questions or concerns.     Education provided: Contact [phone]  with any changes, questions or concerns.     Plan made per [facility] anticoagulation protocol    Francisca Godwin RN  Anticoagulation Clinic  6/8/2022    _______________________________________________________________________     Anticoagulation Episode Summary     Current INR goal:  2.0-3.0   TTR:  77.5 % (1 y)   Target end date:  1/1/2070   Send INR reminders to:  JESSICA TAPIA    Indications    Personal history of DVT (deep vein thrombosis) [Z86.718]  History of pulmonary embolism [Z86.711]           Comments:           Anticoagulation Care Providers     Provider Role Specialty Phone number    Odilon Corral MD Referring Family Medicine [phone]

## 2022-06-24 ENCOUNTER — OFFICE VISIT (OUTPATIENT)
Dept: FAMILY MEDICINE | Facility: CLINIC | Age: 57
End: 2022-06-24
Payer: COMMERCIAL

## 2022-06-24 VITALS
SYSTOLIC BLOOD PRESSURE: 140 MMHG | HEART RATE: 68 BPM | WEIGHT: 198 LBS | HEIGHT: 71 IN | BODY MASS INDEX: 27.72 KG/M2 | TEMPERATURE: 97.7 F | OXYGEN SATURATION: 98 % | DIASTOLIC BLOOD PRESSURE: 80 MMHG

## 2022-06-24 DIAGNOSIS — Z86.711 HISTORY OF PULMONARY EMBOLISM: ICD-10-CM

## 2022-06-24 DIAGNOSIS — Z00.00 ROUTINE GENERAL MEDICAL EXAMINATION AT A HEALTH CARE FACILITY: Primary | ICD-10-CM

## 2022-06-24 DIAGNOSIS — Z86.718 PERSONAL HISTORY OF DVT (DEEP VEIN THROMBOSIS): ICD-10-CM

## 2022-06-24 DIAGNOSIS — E66.3 OVERWEIGHT (BMI 25.0-29.9): ICD-10-CM

## 2022-06-24 DIAGNOSIS — Z86.0100 HISTORY OF COLONIC POLYPS: ICD-10-CM

## 2022-06-24 DIAGNOSIS — Z23 NEED FOR SHINGLES VACCINE: ICD-10-CM

## 2022-06-24 DIAGNOSIS — E78.5 HYPERLIPIDEMIA LDL GOAL <100: ICD-10-CM

## 2022-06-24 LAB
ANION GAP SERPL CALCULATED.3IONS-SCNC: 5 MMOL/L (ref 3–14)
BUN SERPL-MCNC: 15 MG/DL (ref 7–30)
CALCIUM SERPL-MCNC: 9.3 MG/DL (ref 8.5–10.1)
CHLORIDE BLD-SCNC: 108 MMOL/L (ref 94–109)
CHOLEST SERPL-MCNC: 281 MG/DL
CO2 SERPL-SCNC: 27 MMOL/L (ref 20–32)
CREAT SERPL-MCNC: 0.87 MG/DL (ref 0.66–1.25)
ERYTHROCYTE [DISTWIDTH] IN BLOOD BY AUTOMATED COUNT: 13.8 % (ref 10–15)
FASTING STATUS PATIENT QL REPORTED: YES
GFR SERPL CREATININE-BSD FRML MDRD: >90 ML/MIN/1.73M2
GLUCOSE BLD-MCNC: 91 MG/DL (ref 70–99)
HCT VFR BLD AUTO: 46.2 % (ref 40–53)
HDLC SERPL-MCNC: 64 MG/DL
HGB BLD-MCNC: 15.8 G/DL (ref 13.3–17.7)
LDLC SERPL CALC-MCNC: 200 MG/DL
MCH RBC QN AUTO: 31.4 PG (ref 26.5–33)
MCHC RBC AUTO-ENTMCNC: 34.2 G/DL (ref 31.5–36.5)
MCV RBC AUTO: 92 FL (ref 78–100)
NONHDLC SERPL-MCNC: 217 MG/DL
PLATELET # BLD AUTO: 177 10E3/UL (ref 150–450)
POTASSIUM BLD-SCNC: 4.2 MMOL/L (ref 3.4–5.3)
PSA SERPL-MCNC: 0.85 UG/L (ref 0–4)
RBC # BLD AUTO: 5.03 10E6/UL (ref 4.4–5.9)
SODIUM SERPL-SCNC: 140 MMOL/L (ref 133–144)
TRIGL SERPL-MCNC: 83 MG/DL
WBC # BLD AUTO: 7.2 10E3/UL (ref 4–11)

## 2022-06-24 PROCEDURE — 99396 PREV VISIT EST AGE 40-64: CPT | Mod: 25 | Performed by: FAMILY MEDICINE

## 2022-06-24 PROCEDURE — 84460 ALANINE AMINO (ALT) (SGPT): CPT | Performed by: FAMILY MEDICINE

## 2022-06-24 PROCEDURE — 87389 HIV-1 AG W/HIV-1&-2 AB AG IA: CPT | Performed by: FAMILY MEDICINE

## 2022-06-24 PROCEDURE — 99213 OFFICE O/P EST LOW 20 MIN: CPT | Mod: 25 | Performed by: FAMILY MEDICINE

## 2022-06-24 PROCEDURE — 86803 HEPATITIS C AB TEST: CPT | Performed by: FAMILY MEDICINE

## 2022-06-24 PROCEDURE — 90471 IMMUNIZATION ADMIN: CPT | Performed by: FAMILY MEDICINE

## 2022-06-24 PROCEDURE — G0103 PSA SCREENING: HCPCS | Performed by: FAMILY MEDICINE

## 2022-06-24 PROCEDURE — 80061 LIPID PANEL: CPT | Performed by: FAMILY MEDICINE

## 2022-06-24 PROCEDURE — 85027 COMPLETE CBC AUTOMATED: CPT | Performed by: FAMILY MEDICINE

## 2022-06-24 PROCEDURE — 84450 TRANSFERASE (AST) (SGOT): CPT | Performed by: FAMILY MEDICINE

## 2022-06-24 PROCEDURE — 90750 HZV VACC RECOMBINANT IM: CPT | Performed by: FAMILY MEDICINE

## 2022-06-24 PROCEDURE — 80048 BASIC METABOLIC PNL TOTAL CA: CPT | Performed by: FAMILY MEDICINE

## 2022-06-24 PROCEDURE — 36415 COLL VENOUS BLD VENIPUNCTURE: CPT | Performed by: FAMILY MEDICINE

## 2022-06-24 ASSESSMENT — ENCOUNTER SYMPTOMS
HEARTBURN: 0
SHORTNESS OF BREATH: 0
ARTHRALGIAS: 1
SORE THROAT: 0
HEADACHES: 0
EYE PAIN: 0
HEMATURIA: 0
FREQUENCY: 0
COUGH: 0
DIZZINESS: 0
WEAKNESS: 1
JOINT SWELLING: 0
NERVOUS/ANXIOUS: 0
MYALGIAS: 1
HEMATOCHEZIA: 0
DIARRHEA: 0
FEVER: 0
CONSTIPATION: 0
NAUSEA: 0
ABDOMINAL PAIN: 0
DYSURIA: 0
CHILLS: 0
PALPITATIONS: 0
PARESTHESIAS: 0

## 2022-06-24 ASSESSMENT — PAIN SCALES - GENERAL: PAINLEVEL: NO PAIN (0)

## 2022-06-24 NOTE — LETTER
June 27, 2022    Iftikhar Maldonado  [address] [place] [address]          Dear ,    We are writing to inform you of your test results.    Your lab work came back all nice and normal except for your cholesterol values.  They are quite high.  They have been high in the past and are even higher now.  They are high enough to warrant use of cholesterol-lowering medication according to national guidelines to lower your risk of cardiovascular disease.  I therefore sent a prescription for rosuvastatin to your mail order pharmacy and would advise you to take that medicine once a day and then return to see me in 3 months for a fasting office visit so that we can recheck those numbers.   Let me know if you have any questions about that.       Resulted Orders   Basic metabolic panel   Result Value Ref Range    Sodium 140 133 - 144 mmol/L    Potassium 4.2 3.4 - 5.3 mmol/L    Chloride 108 94 - 109 mmol/L    Carbon Dioxide (CO2) 27 20 - 32 mmol/L    Anion Gap 5 3 - 14 mmol/L    Urea Nitrogen 15 7 - 30 mg/dL    Creatinine 0.87 0.66 - 1.25 mg/dL    Calcium 9.3 8.5 - 10.1 mg/dL    Glucose 91 70 - 99 mg/dL    GFR Estimate >90 >60 mL/min/1.73m2      Comment:      Effective December 21, 2021 eGFRcr in adults is calculated using the 2021 CKD-EPI creatinine equation which includes age and gender (Gaviota et al., [place], DOI: 10.1056/WNTYph5256334)   Lipid panel reflex to direct LDL Fasting   Result Value Ref Range    Cholesterol 281 (H) <200 mg/dL    Triglycerides 83 <150 mg/dL    Direct Measure HDL 64 >=40 mg/dL    LDL Cholesterol Calculated 200 (H) <=100 mg/dL    Non HDL Cholesterol 217 (H) <130 mg/dL    Patient Fasting > 8hrs? Yes     Narrative    Cholesterol  Desirable:  <200 mg/dL    Triglycerides  Normal:  Less than 150 mg/dL  Borderline High:  150-199 mg/dL  High:  200-499 mg/dL  Very High:  Greater than or equal to 500 mg/dL    Direct Measure HDL  Female:  Greater than or equal to 50 mg/dL   Male:  Greater than or equal to  40 mg/dL    LDL Cholesterol  Desirable:  <100mg/dL  Above Desirable:  100-129 mg/dL   Borderline High:  130-159 mg/dL   High:  160-189 mg/dL   Very High:  >= 190 mg/dL    Non HDL Cholesterol  Desirable:  130 mg/dL  Above Desirable:  130-159 mg/dL  Borderline High:  160-189 mg/dL  High:  190-219 mg/dL  Very High:  Greater than or equal to 220 mg/dL   CBC with platelets   Result Value Ref Range    WBC Count 7.2 4.0 - 11.0 10e3/uL    RBC Count 5.03 4.40 - 5.90 10e6/uL    Hemoglobin 15.8 13.3 - 17.7 g/dL    Hematocrit 46.2 40.0 - 53.0 %    MCV 92 78 - 100 fL    MCH 31.4 26.5 - 33.0 pg    MCHC 34.2 31.5 - 36.5 g/dL    RDW 13.8 10.0 - 15.0 %    Platelet Count 177 150 - 450 10e3/uL   Prostate Specific Antigen Screen   Result Value Ref Range    Prostate Specific Antigen Screen 0.85 0.00 - 4.00 ug/L   HIV Antigen Antibody Combo   Result Value Ref Range    HIV Antigen Antibody Combo Nonreactive Nonreactive      Comment:      HIV-1 p24 Ag & HIV-1/HIV-2 Ab Not Detected   Hepatitis C Screen Reflex to HCV RNA Quant and Genotype   Result Value Ref Range    Hepatitis C Antibody Nonreactive Nonreactive    Narrative    Assay performance characteristics have not been established for newborns, infants, and children.       If you have any questions or concerns, please call the clinic at the number listed above.       Sincerely,      Odilon Corral MD

## 2022-06-24 NOTE — PROGRESS NOTES
SUBJECTIVE:   CC: Vitor Maldonado is an 56 year old male who presents for a preventative health visit and follow-up on some baseline health conditions.       Patient has been advised of split billing requirements and indicates understanding: Yes  Healthy Habits:     Getting at least 3 servings of Calcium per day:  Yes    Bi-annual eye exam:  Yes    Dental care twice a year:  Yes    Sleep apnea or symptoms of sleep apnea:  Daytime drowsiness    Diet:  Low salt    Frequency of exercise:  4-5 days/week    Duration of exercise:  30-45 minutes    Taking medications regularly:  Yes    Medication side effects:  None    PHQ-2 Total Score: 0    He is a relatively new patient to me and our clinic.  I had a virtual visit with him a year ago, but this is the first time I have seen him in clinic.  He has a history of DVT and PE and is on lifelong warfarin for that.  He gets regular INR checks because of that.  He is on no other routine prescription medications.  He generally feels well.  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    Today's PHQ-2 Score:   PHQ-2 ( 1999 Pfizer) 6/24/2022   Q1: Little interest or pleasure in doing things 0   Q2: Feeling down, depressed or hopeless 0   PHQ-2 Score 0   Q1: Little interest or pleasure in doing things Not at all   Q2: Feeling down, depressed or hopeless Not at all   PHQ-2 Score 0       Abuse: Current or Past(Physical, Sexual or Emotional)- No  Do you feel safe in your environment? Yes    Have you ever done Advance Care Planning? (For example, a Health Directive, POLST, or a discussion with a medical provider or your loved ones about your wishes): No, advance care planning information given to patient to review.  Patient declined advance care planning discussion at this time.    Social History     Tobacco Use     Smoking status: Never Smoker     Smokeless tobacco: Never Used   Substance Use Topics     Alcohol  use: Yes     If you drink alcohol do you typically have >3 drinks per day or >7 drinks per week? Yes      Alcohol Use 6/24/2022   Prescreen: >3 drinks/day or >7 drinks/week? Yes   AUDIT SCORE  5     AUDIT - Alcohol Use Disorders Identification Test - Reproduced from the World Health Organization Audit 2001 (Second Edition) 6/24/2022   1.  How often do you have a drink containing alcohol? 4 or more times a week   2.  How many drinks containing alcohol do you have on a typical day when you are drinking? 1 or 2   3.  How often do you have five or more drinks on one occasion? Less than monthly   4.  How often during the last year have you found that you were not able to stop drinking once you had started? Never   5.  How often during the last year have you failed to do what was normally expected of you because of drinking? Never   6.  How often during the last year have you needed a first drink in the morning to get yourself going after a heavy drinking session? Never   7.  How often during the last year have you had a feeling of guilt or remorse after drinking? Never   8.  How often during the last year have you been unable to remember what happened the night before because of your drinking? Never   9.  Have you or someone else been injured because of your drinking? No   10. Has a relative, friend, doctor or other health care worker been concerned about your drinking or suggested you cut down? No   TOTAL SCORE 5       Last PSA: No results found for: PSA    Reviewed orders with patient. Reviewed health maintenance and updated orders accordingly - Yes  Patient Active Problem List   Diagnosis     Personal history of DVT (deep vein thrombosis)     History of pulmonary embolism     History of colonic polyps     Hyperlipidemia LDL goal <100     Overweight (BMI 25.0-29.9)     History reviewed. No pertinent surgical history.    Social History     Tobacco Use     Smoking status: Never Smoker     Smokeless tobacco: Never Used  "  Substance Use Topics     Alcohol use: Yes     History reviewed. No pertinent family history.      Current Outpatient Medications   Medication Sig Dispense Refill     garlic 150 MG TABS tablet Take 150 mg by mouth daily       Yi Ginseng 250 MG CAPS Take 1 capsule by mouth       warfarin ANTICOAGULANT (COUMADIN) 2.5 MG tablet TAKE 2 TABLETS ON SUNDAY, WEDNESDAY AND 1 TABLET ALL OTHER DAYS AS DIRECTED BY THE ANTICOAGULATION CLINIC 11 110 tablet 3     Multiple Vitamins-Minerals (CENTRUM ADULTS) TABS Take 1 tablet by mouth (Patient not taking: Reported on 6/24/2022)       No Known Allergies    Reviewed and updated as needed this visit by clinical staff                    Reviewed and updated as needed this visit by Provider                       Review of Systems   Constitutional: Negative for chills and fever.   HENT: Negative for congestion, ear pain, hearing loss and sore throat.    Eyes: Negative for pain and visual disturbance.   Respiratory: Negative for cough and shortness of breath.    Cardiovascular: Negative for chest pain, palpitations and peripheral edema.   Gastrointestinal: Negative for abdominal pain, constipation, diarrhea, heartburn, hematochezia and nausea.   Genitourinary: Positive for impotence. Negative for dysuria, frequency, genital sores, hematuria, penile discharge and urgency.   Musculoskeletal: Positive for arthralgias and myalgias. Negative for joint swelling.   Skin: Negative for rash.   Neurological: Positive for weakness. Negative for dizziness, headaches and paresthesias.   Psychiatric/Behavioral: Negative for mood changes. The patient is not nervous/anxious.      He is  and has not been sexually active in years.  He has no concerns about STIs.    OBJECTIVE:   BP (!) 140/80 (BP Location: Left arm, Patient Position: Chair, Cuff Size: Adult Regular)   Pulse 68   Temp 97.7  F (36.5  C) (Oral)   Ht 1.811 m (5' 11.3\")   Wt 89.8 kg (198 lb)   SpO2 98%   BMI 27.38 kg/m  "     Physical Exam  GENERAL: alert, no distress and over weight  EYES: Eyes grossly normal to inspection, PERRL and conjunctivae and sclerae normal  HENT: ear canals and TM's normal  NECK: no adenopathy, no asymmetry, masses, or scars and thyroid normal to palpation  RESP: lungs clear to auscultation - no rales, rhonchi or wheezes  CV: regular rate and rhythm, normal S1 S2, no S3 or S4, no murmur, click or rub, trace left lower extremity peripheral edema and peripheral pulses strong  ABDOMEN: soft, nontender, no hepatosplenomegaly, no masses   MS: no gross musculoskeletal defects noted, no edema  SKIN: no suspicious lesions or rashes.  He has some venous stasis changes of the lower extremities.  NEURO: Normal strength and tone, mentation intact and speech normal  PSYCH: mentation appears normal, affect normal/bright    Diagnostic Test Results:  Labs reviewed in [facility] via Care Everywhere    ASSESSMENT/PLAN:       ICD-10-CM    1. Routine general medical examination at a health care facility  Z00.00 Basic metabolic panel     Lipid panel reflex to direct LDL Fasting     CBC with platelets     Prostate Specific Antigen Screen     HIV Antigen Antibody Combo     Hepatitis C Screen Reflex to HCV RNA Quant and Genotype   2. Personal history of DVT (deep vein thrombosis)  Z86.718    3. History of pulmonary embolism  Z86.711    4. History of colonic polyps  Z86.010    5. Overweight (BMI 25.0-29.9)  E66.3    6. Hyperlipidemia LDL goal <100  E78.5    7. Need for shingles vaccine  Z23 ZOSTER VACCINE RECOMBINANT ADJUVANTED IM NJX     Blood pressure is borderline high, but the rest of his vital signs look good  We discussed diet and exercise treatment for that and control of weight  He does have a history of hyperlipidemia, so we will check fasting lab work today as above  Continue ongoing use of warfarin  We discussed routine vaccines and he declined a COVID booster dose today, but we will do a Shingrix vaccine  I advised him to get  "his second Shingrix dose in 2 to 6 months, and also to get a COVID booster dose in the upcoming months and to get a tetanus booster by the end of the year  He can get these extra vaccines at a local pharmacy  Plan a tentative recheck in 1 year, or sooner prn      COUNSELING:   Reviewed preventive health counseling, as reflected in patient instructions       Regular exercise       Healthy diet/nutrition       Immunizations    Vaccinated for: Zoster          Estimated body mass index is 27.38 kg/m  as calculated from the following:    Height as of this encounter: 1.811 m (5' 11.3\").    Weight as of this encounter: 89.8 kg (198 lb).     Weight management plan: Discussed healthy diet and exercise guidelines    He reports that he has never smoked. He has never used smokeless tobacco.      Counseling Resources:  ATP IV Guidelines  Pooled Cohorts Equation Calculator  FRAX Risk Assessment  ICSI Preventive Guidelines  Dietary Guidelines for Americans, 2010  USDA's MyPlate  ASA Prophylaxis  Lung CA Screening    Odilon Corral MD  [facility]  "

## 2022-06-25 LAB
HCV AB SERPL QL IA: NONREACTIVE
HIV 1+2 AB+HIV1 P24 AG SERPL QL IA: NONREACTIVE

## 2022-06-27 DIAGNOSIS — E78.5 HYPERLIPIDEMIA LDL GOAL <100: Primary | ICD-10-CM

## 2022-06-27 LAB
ALT SERPL W P-5'-P-CCNC: 24 U/L (ref 0–70)
AST SERPL W P-5'-P-CCNC: 19 U/L (ref 0–45)

## 2022-06-27 RX ORDER — ROSUVASTATIN CALCIUM 10 MG/1
10 TABLET, COATED ORAL DAILY
Qty: 90 TABLET | Refills: 3 | Status: SHIPPED | OUTPATIENT
Start: 2022-06-27

## 2022-06-27 NOTE — PROGRESS NOTES
Cholesterol values are quite high.  We will start rosuvastatin for that.  Plan a recheck in 3 months.    Odilon Corral MD

## 2022-06-27 NOTE — RESULT ENCOUNTER NOTE
Iftikhar,  Your lab work came back all nice and normal except for your cholesterol values.  They are quite high.  They have been high in the past and are even higher now.  They are high enough to warrant use of cholesterol-lowering medication according to national guidelines to lower your risk of cardiovascular disease.  I therefore sent a prescription for rosuvastatin to your mail order pharmacy and would advise you to take that medicine once a day and then return to see me in 3 months for a fasting office visit so that we can recheck those numbers.  Let me know if you have any questions about that.    Odilon Corral MD

## 2022-07-21 ENCOUNTER — ANTICOAGULATION THERAPY VISIT (OUTPATIENT)
Dept: ANTICOAGULATION | Facility: CLINIC | Age: 57
End: 2022-07-21

## 2022-07-21 ENCOUNTER — LAB (OUTPATIENT)
Dept: LAB | Facility: CLINIC | Age: 57
End: 2022-07-21
Payer: COMMERCIAL

## 2022-07-21 DIAGNOSIS — Z86.711 HISTORY OF PULMONARY EMBOLISM: ICD-10-CM

## 2022-07-21 DIAGNOSIS — Z86.718 PERSONAL HISTORY OF DVT (DEEP VEIN THROMBOSIS): Primary | ICD-10-CM

## 2022-07-21 DIAGNOSIS — Z86.718 PERSONAL HISTORY OF DVT (DEEP VEIN THROMBOSIS): ICD-10-CM

## 2022-07-21 LAB — INR BLD: 3.7 (ref 0.9–1.1)

## 2022-07-21 PROCEDURE — 85610 PROTHROMBIN TIME: CPT

## 2022-07-21 PROCEDURE — 36416 COLLJ CAPILLARY BLOOD SPEC: CPT

## 2022-07-21 NOTE — PROGRESS NOTES
ANTICOAGULATION MANAGEMENT     Vitor Maldonado 56 year old male is on warfarin with supratherapeutic INR result. (Goal INR 2.0-3.0)    Recent labs: (last 7 days)     07/21/22  1558   INR 3.7*       ASSESSMENT       Source(s): Chart Review    Previous INR was Therapeutic last 2(+) visits    diet, health changes since last INR     Started Rosuvastatin which can increase INR           PLAN     Unable to reach Iftikhar today.    Left message to hold warfarin tonight. Request call back for assessment.    Follow up required to confirm warfarin dose taken and assess for changes    Portia Mackey RN  Anticoagulation Clinic  7/21/2022

## 2022-07-22 ENCOUNTER — TELEPHONE (OUTPATIENT)
Dept: NURSING | Facility: CLINIC | Age: 57
End: 2022-07-22

## 2022-07-22 NOTE — PROGRESS NOTES
ANTICOAGULATION MANAGEMENT     Vitor Maldonado 56 year old male is on warfarin with supratherapeutic INR result. (Goal INR 2.0-3.0)    Recent labs: (last 7 days)     07/21/22  1558   INR 3.7*       ASSESSMENT       Source(s): Chart Review and Patient/Caregiver Call       Warfarin doses taken: Warfarin taken as instructed    Diet: No new diet changes identified    New illness, injury, or hospitalization: No    Medication/supplement changes: None noted    Signs or symptoms of bleeding or clotting: No    Previous INR: Therapeutic last 2(+) visits    Additional findings: None       PLAN     Recommended plan for no diet, medication or health factor changes affecting INR     Dosing Instructions: hold dose then decrease your warfarin dose (11% change) with next INR in 2 weeks       Summary  As of 7/21/2022    Full warfarin instructions:  7/21: Hold; Otherwise 5 mg every Sun; 2.5 mg all other days   Next INR check:  8/4/2022             Telephone call with Iftikhar who verbalizes understanding and agrees to plan    Patient offered & declined to schedule next visit    Education provided: Goal range and significance of current result    Plan made per [facility] anticoagulation protocol    Portia Mackey RN  Anticoagulation Clinic  7/22/2022    _______________________________________________________________________     Anticoagulation Episode Summary     Current INR goal:  2.0-3.0   TTR:  72.3 % (1 y)   Target end date:  1/1/2070   Send INR reminders to:  JESSICA TAPIA    Indications    Personal history of DVT (deep vein thrombosis) [Z86.718]  History of pulmonary embolism [Z86.711]           Comments:           Anticoagulation Care Providers     Provider Role Specialty Phone number    Odilon Corral MD Referring Family Medicine [phone]

## 2022-07-22 NOTE — TELEPHONE ENCOUNTER
Reason for Call:  Other returning call    Detailed comments: Patient returning Portia's call, but  She was away from her phone    Phone Number Patient can be reached at: Home number on file [phone] (home)    Best Time: anytime    Can we leave a detailed message on this number? YES    Call taken on 7/22/2022 at 9:22 AM by Dianna Thurman

## 2022-07-22 NOTE — TELEPHONE ENCOUNTER
See anticoag encounter for 7/21    Portia Mackey RN - [facility] Anticoagulation Clinic

## 2022-08-10 ENCOUNTER — ANTICOAGULATION THERAPY VISIT (OUTPATIENT)
Dept: ANTICOAGULATION | Facility: CLINIC | Age: 57
End: 2022-08-10

## 2022-08-10 ENCOUNTER — LAB (OUTPATIENT)
Dept: LAB | Facility: CLINIC | Age: 57
End: 2022-08-10
Payer: COMMERCIAL

## 2022-08-10 DIAGNOSIS — Z86.718 PERSONAL HISTORY OF DVT (DEEP VEIN THROMBOSIS): Primary | ICD-10-CM

## 2022-08-10 DIAGNOSIS — Z86.718 PERSONAL HISTORY OF DVT (DEEP VEIN THROMBOSIS): ICD-10-CM

## 2022-08-10 DIAGNOSIS — Z86.711 HISTORY OF PULMONARY EMBOLISM: ICD-10-CM

## 2022-08-10 LAB — INR BLD: 2.3 (ref 0.9–1.1)

## 2022-08-10 PROCEDURE — 36416 COLLJ CAPILLARY BLOOD SPEC: CPT

## 2022-08-10 PROCEDURE — 85610 PROTHROMBIN TIME: CPT

## 2022-08-10 NOTE — PROGRESS NOTES
ANTICOAGULATION MANAGEMENT     Vitor Maldonado 56 year old male is on warfarin with therapeutic INR result. (Goal INR 2.0-3.0)    Recent labs: (last 7 days)     08/10/22  1542   INR 2.3*       ASSESSMENT       Source(s): Chart Review and Patient/Caregiver Call       Warfarin doses taken: Warfarin taken as instructed    Diet: No new diet changes identified    New illness, injury, or hospitalization: No    Medication/supplement changes: None noted    Signs or symptoms of bleeding or clotting: No    Previous INR: Supratherapeutic    Additional findings: None       PLAN     Recommended plan for no diet, medication or health factor changes affecting INR     Dosing Instructions: Continue your current warfarin dose with next INR in 4 weeks       Summary  As of 8/10/2022    Full warfarin instructions:  5 mg every Sun; 2.5 mg all other days   Next INR check:  9/7/2022             Telephone call with Iftikhar who verbalizes understanding and agrees to plan    Lab visit scheduled    Education provided: Goal range and significance of current result    Plan made per [facility] anticoagulation protocol    Portia Mackey RN  Anticoagulation Clinic  8/10/2022    _______________________________________________________________________     Anticoagulation Episode Summary     Current INR goal:  2.0-3.0   TTR:  69.6 % (1 y)   Target end date:  1/1/2070   Send INR reminders to:  JESSICA TAPIA    Indications    Personal history of DVT (deep vein thrombosis) [Z86.718]  History of pulmonary embolism [Z86.711]           Comments:           Anticoagulation Care Providers     Provider Role Specialty Phone number    Odilon Corral MD Referring Family Medicine [phone]

## 2022-09-08 ENCOUNTER — LAB (OUTPATIENT)
Dept: LAB | Facility: CLINIC | Age: 57
End: 2022-09-08
Payer: COMMERCIAL

## 2022-09-08 ENCOUNTER — ANTICOAGULATION THERAPY VISIT (OUTPATIENT)
Dept: ANTICOAGULATION | Facility: CLINIC | Age: 57
End: 2022-09-08

## 2022-09-08 DIAGNOSIS — Z86.718 PERSONAL HISTORY OF DVT (DEEP VEIN THROMBOSIS): ICD-10-CM

## 2022-09-08 DIAGNOSIS — Z86.711 HISTORY OF PULMONARY EMBOLISM: ICD-10-CM

## 2022-09-08 DIAGNOSIS — Z86.718 PERSONAL HISTORY OF DVT (DEEP VEIN THROMBOSIS): Primary | ICD-10-CM

## 2022-09-08 LAB — INR BLD: 3.6 (ref 0.9–1.1)

## 2022-09-08 PROCEDURE — 36416 COLLJ CAPILLARY BLOOD SPEC: CPT

## 2022-09-08 PROCEDURE — 85610 PROTHROMBIN TIME: CPT

## 2022-09-08 NOTE — PROGRESS NOTES
ANTICOAGULATION MANAGEMENT     Vitor Maldonado 56 year old male is on warfarin with supratherapeutic INR result. (Goal INR 2.0-3.0)    Recent labs: (last 7 days)     09/08/22  1552   INR 3.6*       ASSESSMENT       Source(s): Chart Review    Previous INR was Therapeutic last visit; previously outside of goal range    Medication, diet, health changes since last INR chart reviewed; none identified           PLAN     Unable to reach Iftikhar today.    Left message to take reduced dose of warfarin, 1.25 mg tonight. Request call back for assessment.    Follow up required to confirm warfarin dose taken and assess for changes and discuss out of range result     Delmi Calderon RN  Anticoagulation Clinic  9/8/2022

## 2022-09-09 NOTE — PROGRESS NOTES
ANTICOAGULATION MANAGEMENT     Vitor Maldonado 56 year old male is on warfarin with supratherapeutic INR result. (Goal INR 2.0-3.0)    Recent labs: (last 7 days)     09/08/22  1552   INR 3.6*       ASSESSMENT       Source(s): Chart Review and Patient/Caregiver Call       Warfarin doses taken: More warfarin taken than planned which may be affecting INR Took extra dose    Diet: No new diet changes identified    New illness, injury, or hospitalization: No    Medication/supplement changes: None noted    Signs or symptoms of bleeding or clotting: No    Previous INR: Therapeutic last visit; previously outside of goal range    Additional findings: None       PLAN     Recommended plan for temporary change(s) affecting INR     Dosing Instructions: partial hold then continue your current warfarin dose with next INR in 2 weeks       Summary  As of 9/8/2022    Full warfarin instructions:  9/8: 1.25 mg; Otherwise 5 mg every Sun; 2.5 mg all other days   Next INR check:  9/15/2022             Telephone call with Iftikhar who verbalizes understanding and agrees to plan    Lab visit scheduled    Education provided: Please call back if any changes to your diet, medications or how you've been taking warfarin    Plan made per [facility] anticoagulation protocol    Angela Silveira, RN  Anticoagulation Clinic  9/9/2022    _______________________________________________________________________     Anticoagulation Episode Summary     Current INR goal:  2.0-3.0   TTR:  65.8 % (1 y)   Target end date:  1/1/2070   Send INR reminders to:  JESSICA TAPIA    Indications    Personal history of DVT (deep vein thrombosis) [Z86.718]  History of pulmonary embolism [Z86.711]           Comments:           Anticoagulation Care Providers     Provider Role Specialty Phone number    Odilon Corral MD Referring Family Medicine [phone]

## 2022-09-22 ENCOUNTER — ANTICOAGULATION THERAPY VISIT (OUTPATIENT)
Dept: ANTICOAGULATION | Facility: CLINIC | Age: 57
End: 2022-09-22

## 2022-09-22 ENCOUNTER — LAB (OUTPATIENT)
Dept: LAB | Facility: CLINIC | Age: 57
End: 2022-09-22
Payer: COMMERCIAL

## 2022-09-22 DIAGNOSIS — Z86.718 PERSONAL HISTORY OF DVT (DEEP VEIN THROMBOSIS): Primary | ICD-10-CM

## 2022-09-22 DIAGNOSIS — Z86.711 HISTORY OF PULMONARY EMBOLISM: ICD-10-CM

## 2022-09-22 DIAGNOSIS — Z86.718 PERSONAL HISTORY OF DVT (DEEP VEIN THROMBOSIS): ICD-10-CM

## 2022-09-22 LAB — INR BLD: 2.7 (ref 0.9–1.1)

## 2022-09-22 PROCEDURE — 36416 COLLJ CAPILLARY BLOOD SPEC: CPT

## 2022-09-22 PROCEDURE — 85610 PROTHROMBIN TIME: CPT

## 2022-09-22 NOTE — PROGRESS NOTES
ANTICOAGULATION MANAGEMENT     Vitor Maldonado 56 year old male is on warfarin with therapeutic INR result. (Goal INR 2.0-3.0)    Recent labs: (last 7 days)     09/22/22  1548   INR 2.7*       ASSESSMENT       Source(s): Chart Review    Previous INR was Supratherapeutic    Medication, diet, health changes since last INR chart reviewed; none identified           PLAN     Recommended plan for no diet, medication or health factor changes affecting INR     Dosing Instructions: Continue your current warfarin dose with next INR in 4 weeks       Summary  As of 9/22/2022    Full warfarin instructions:  5 mg every Sun; 2.5 mg all other days   Next INR check:  10/20/2022             Detailed voice message left for Iftikhar with dosing instructions and follow up date.     Contact [phone]  to schedule and with any changes, questions or concerns.     Education provided: Please call back if any changes to your diet, medications or how you've been taking warfarin and Goal range and significance of current result    Plan made per [facility] anticoagulation protocol    Portia Mackey RN  Anticoagulation Clinic  9/22/2022    _______________________________________________________________________     Anticoagulation Episode Summary     Current INR goal:  2.0-3.0   TTR:  63.4 % (1 y)   Target end date:  1/1/2070   Send INR reminders to:  JESSICA TAPIA    Indications    Personal history of DVT (deep vein thrombosis) [Z86.718]  History of pulmonary embolism [Z86.711]           Comments:           Anticoagulation Care Providers     Provider Role Specialty Phone number    Odilon Corral MD Referring Family Medicine [phone]

## 2022-10-18 ENCOUNTER — OFFICE VISIT (OUTPATIENT)
Dept: FAMILY MEDICINE | Facility: CLINIC | Age: 57
End: 2022-10-18
Payer: COMMERCIAL

## 2022-10-18 VITALS
HEART RATE: 52 BPM | BODY MASS INDEX: 27.3 KG/M2 | HEIGHT: 71 IN | WEIGHT: 195 LBS | OXYGEN SATURATION: 99 % | SYSTOLIC BLOOD PRESSURE: 150 MMHG | DIASTOLIC BLOOD PRESSURE: 90 MMHG | TEMPERATURE: 97.7 F

## 2022-10-18 DIAGNOSIS — Z23 NEED FOR PROPHYLACTIC VACCINATION AND INOCULATION AGAINST INFLUENZA: ICD-10-CM

## 2022-10-18 DIAGNOSIS — Z86.711 HISTORY OF PULMONARY EMBOLISM: ICD-10-CM

## 2022-10-18 DIAGNOSIS — Z23 NEED FOR SHINGLES VACCINE: ICD-10-CM

## 2022-10-18 DIAGNOSIS — E66.3 OVERWEIGHT (BMI 25.0-29.9): ICD-10-CM

## 2022-10-18 DIAGNOSIS — Z86.718 PERSONAL HISTORY OF DVT (DEEP VEIN THROMBOSIS): ICD-10-CM

## 2022-10-18 DIAGNOSIS — R03.0 ELEVATED BP WITHOUT DIAGNOSIS OF HYPERTENSION: ICD-10-CM

## 2022-10-18 DIAGNOSIS — E78.5 HYPERLIPIDEMIA LDL GOAL <100: Primary | ICD-10-CM

## 2022-10-18 LAB
ALT SERPL W P-5'-P-CCNC: 23 U/L (ref 0–70)
AST SERPL W P-5'-P-CCNC: 18 U/L (ref 0–45)
CHOLEST SERPL-MCNC: 167 MG/DL
FASTING STATUS PATIENT QL REPORTED: YES
HDLC SERPL-MCNC: 69 MG/DL
LDLC SERPL CALC-MCNC: 69 MG/DL
NONHDLC SERPL-MCNC: 98 MG/DL
TRIGL SERPL-MCNC: 143 MG/DL

## 2022-10-18 PROCEDURE — 84450 TRANSFERASE (AST) (SGOT): CPT | Performed by: FAMILY MEDICINE

## 2022-10-18 PROCEDURE — 90750 HZV VACC RECOMBINANT IM: CPT | Performed by: FAMILY MEDICINE

## 2022-10-18 PROCEDURE — 36415 COLL VENOUS BLD VENIPUNCTURE: CPT | Performed by: FAMILY MEDICINE

## 2022-10-18 PROCEDURE — 99213 OFFICE O/P EST LOW 20 MIN: CPT | Mod: 25 | Performed by: FAMILY MEDICINE

## 2022-10-18 PROCEDURE — 90682 RIV4 VACC RECOMBINANT DNA IM: CPT | Performed by: FAMILY MEDICINE

## 2022-10-18 PROCEDURE — 90471 IMMUNIZATION ADMIN: CPT | Performed by: FAMILY MEDICINE

## 2022-10-18 PROCEDURE — 80061 LIPID PANEL: CPT | Performed by: FAMILY MEDICINE

## 2022-10-18 PROCEDURE — 90472 IMMUNIZATION ADMIN EACH ADD: CPT | Performed by: FAMILY MEDICINE

## 2022-10-18 PROCEDURE — 84460 ALANINE AMINO (ALT) (SGPT): CPT | Performed by: FAMILY MEDICINE

## 2022-10-18 NOTE — PROGRESS NOTES
Assessment & Plan       ICD-10-CM    1. Hyperlipidemia LDL goal <100  E78.5 Lipid panel reflex to direct LDL Fasting     AST     ALT      2. Overweight (BMI 25.0-29.9)  E66.3       3. Elevated BP without diagnosis of hypertension  R03.0       4. Personal history of DVT (deep vein thrombosis)  Z86.718       5. History of pulmonary embolism  Z86.711       6. Need for shingles vaccine  Z23 ZOSTER VACCINE RECOMBINANT ADJUVANTED IM NJX      7. Need for prophylactic vaccination and inoculation against influenza  Z23 INFLUENZA QUAD, RECOMBINANT, P-FREE (RIV4) (FLUBLOK)        We will recheck some fasting lab work today as above to see how the rosuvastatin is working for him  We will likely continue his same meds  His blood pressure has been on the high side as well, so we discussed diet and exercise treatment for that  I asked him to check home blood pressure readings on a regular basis and to follow-up in the next few months if his blood pressure is averaging over 140/90  We will give him a second shingles vaccine today and also a flu shot  He will plan to get a bivalent COVID-vaccine booster dose in a month or so    Plan a tentative recheck in 8 to 9 months with another physical and repeat fasting lab work (but follow-up sooner if blood pressure readings are running high)    Return in about 8 months (around 6/18/2023) for Physical Exam, Lab Work, Routine Visit, BP Recheck.    Odilon Corral MD  [facility] REGINA Buitrago is a 56 year old, presenting for the following health issues:  Recheck Medication (F/U Cholesterol meds)      History of Present Illness       He eats 0-1 servings of fruits and vegetables daily.He consumes 0 sweetened beverage(s) daily.He exercises with enough effort to increase his heart rate 30 to 60 minutes per day.  He exercises with enough effort to increase his heart rate 5 days per week.   He is taking medications regularly.     We started him on rosuvastatin a few  "months ago because of significantly elevated total cholesterol and LDL levels.  He has been taking the medicine daily and seems to tolerate it well.  He remains on warfarin for history of DVT and PE.  He is trying to stay physically active and eat healthy.  He has lost a few pounds since his physical back at the end of June.  He came in fasting today for repeat lab work.    Patient Active Problem List   Diagnosis     Personal history of DVT (deep vein thrombosis)     History of pulmonary embolism     History of colonic polyps     Hyperlipidemia LDL goal <100     Overweight (BMI 25.0-29.9)     Current Outpatient Medications   Medication     Multiple Vitamins-Minerals (CENTRUM ADULTS) TABS     rosuvastatin (CRESTOR) 10 MG tablet     warfarin ANTICOAGULANT (COUMADIN) 2.5 MG tablet     No current facility-administered medications for this visit.         Review of Systems   Constitutional, HEENT, cardiovascular, pulmonary, gi and gu systems are negative, except as otherwise noted.      Objective    BP (!) 150/90 (BP Location: Left arm, Patient Position: Sitting, Cuff Size: Adult Regular)   Pulse 52   Temp 97.7  F (36.5  C) (Oral)   Ht 1.81 m (5' 11.26\")   Wt 88.5 kg (195 lb)   SpO2 99%   BMI 27.00 kg/m    Body mass index is 27 kg/m .  Physical Exam   GENERAL: healthy, alert and no distress    Lab on 09/22/2022   Component Date Value Ref Range Status     INR 09/22/2022 2.7 (H)  0.9 - 1.1 Final   Lab on 09/08/2022   Component Date Value Ref Range Status     INR 09/08/2022 3.6 (H)  0.9 - 1.1 Final   Lab on 08/10/2022   Component Date Value Ref Range Status     INR 08/10/2022 2.3 (H)  0.9 - 1.1 Final   Lab on 07/21/2022   Component Date Value Ref Range Status     INR 07/21/2022 3.7 (H)  0.9 - 1.1 Final   Orders Only on 06/27/2022   Component Date Value Ref Range Status     AST 06/24/2022 19  0 - 45 U/L Final     ALT 06/24/2022 24  0 - 70 U/L Final   Office Visit on 06/24/2022   Component Date Value Ref Range Status     " Sodium 06/24/2022 140  133 - 144 mmol/L Final     Potassium 06/24/2022 4.2  3.4 - 5.3 mmol/L Final     Chloride 06/24/2022 108  94 - 109 mmol/L Final     Carbon Dioxide (CO2) 06/24/2022 27  20 - 32 mmol/L Final     Anion Gap 06/24/2022 5  3 - 14 mmol/L Final     Urea Nitrogen 06/24/2022 15  7 - 30 mg/dL Final     Creatinine 06/24/2022 0.87  0.66 - 1.25 mg/dL Final     Calcium 06/24/2022 9.3  8.5 - 10.1 mg/dL Final     Glucose 06/24/2022 91  70 - 99 mg/dL Final     GFR Estimate 06/24/2022 >90  >60 mL/min/1.73m2 Final    Effective December 21, 2021 eGFRcr in adults is calculated using the 2021 CKD-EPI creatinine equation which includes age and gender (Gaviota et al., [facility], DOI: 10.1056/ZMAHpk6327592)     Cholesterol 06/24/2022 281 (H)  <200 mg/dL Final     Triglycerides 06/24/2022 83  <150 mg/dL Final     Direct Measure HDL 06/24/2022 64  >=40 mg/dL Final     LDL Cholesterol Calculated 06/24/2022 200 (H)  <=100 mg/dL Final     Non HDL Cholesterol 06/24/2022 217 (H)  <130 mg/dL Final     Patient Fasting > 8hrs? 06/24/2022 Yes   Final     WBC Count 06/24/2022 7.2  4.0 - 11.0 10e3/uL Final     RBC Count 06/24/2022 5.03  4.40 - 5.90 10e6/uL Final     Hemoglobin 06/24/2022 15.8  13.3 - 17.7 g/dL Final     Hematocrit 06/24/2022 46.2  40.0 - 53.0 % Final     MCV 06/24/2022 92  78 - 100 fL Final     MCH 06/24/2022 31.4  26.5 - 33.0 pg Final     MCHC 06/24/2022 34.2  31.5 - 36.5 g/dL Final     RDW 06/24/2022 13.8  10.0 - 15.0 % Final     Platelet Count 06/24/2022 177  150 - 450 10e3/uL Final     Prostate Specific Antigen Screen 06/24/2022 0.85  0.00 - 4.00 ug/L Final     HIV Antigen Antibody Combo 06/24/2022 Nonreactive  Nonreactive Final    HIV-1 p24 Ag & HIV-1/HIV-2 Ab Not Detected     Hepatitis C Antibody 06/24/2022 Nonreactive  Nonreactive Final     The 10-year ASCVD risk score (Saskia REYNOSO, et al., 2019) is: 11%    Values used to calculate the score:      Age: 56 years      Sex: Male      Is Non- :  No      Diabetic: No      Tobacco smoker: No      Systolic Blood Pressure: 150 mmHg      Is BP treated: No      HDL Cholesterol: 64 mg/dL      Total Cholesterol: 281 mg/dL

## 2022-10-18 NOTE — LETTER
October 20, 2022    Iftikhar Maldonado  [address] [place] [address]          Dear ,    We are writing to inform you of your test results.    Your cholesterol values look wonderful now and are all nicely down into the normal range.  Your liver tests remain normal as well.   Please continue your same rosuvastatin.  I would advise another recheck on this next summer with an annual physical (or sooner if your blood pressure readings are running high in the meantime).      Resulted Orders   Lipid panel reflex to direct LDL Fasting   Result Value Ref Range    Cholesterol 167 <200 mg/dL    Triglycerides 143 <150 mg/dL    Direct Measure HDL 69 >=40 mg/dL    LDL Cholesterol Calculated 69 <=100 mg/dL    Non HDL Cholesterol 98 <130 mg/dL    Patient Fasting > 8hrs? Yes     Narrative    Cholesterol  Desirable:  <200 mg/dL    Triglycerides  Normal:  Less than 150 mg/dL  Borderline High:  150-199 mg/dL  High:  200-499 mg/dL  Very High:  Greater than or equal to 500 mg/dL    Direct Measure HDL  Female:  Greater than or equal to 50 mg/dL   Male:  Greater than or equal to 40 mg/dL    LDL Cholesterol  Desirable:  <100mg/dL  Above Desirable:  100-129 mg/dL   Borderline High:  130-159 mg/dL   High:  160-189 mg/dL   Very High:  >= 190 mg/dL    Non HDL Cholesterol  Desirable:  130 mg/dL  Above Desirable:  130-159 mg/dL  Borderline High:  160-189 mg/dL  High:  190-219 mg/dL  Very High:  Greater than or equal to 220 mg/dL   AST   Result Value Ref Range    AST 18 0 - 45 U/L   ALT   Result Value Ref Range    ALT 23 0 - 70 U/L       If you have any questions or concerns, please call the clinic at the number listed above.       Sincerely,      Odilon Corral MD

## 2022-10-19 DIAGNOSIS — Z86.711 HISTORY OF PULMONARY EMBOLISM: ICD-10-CM

## 2022-10-19 NOTE — RESULT ENCOUNTER NOTE
Iftikhar,  Your cholesterol values look wonderful now and are all nicely down into the normal range.  Your liver tests remain normal as well.  Please continue your same rosuvastatin.  I would advise another recheck on this next summer with an annual physical (or sooner if your blood pressure readings are running high in the meantime).    Odilon Corral MD

## 2022-10-20 RX ORDER — WARFARIN SODIUM 2.5 MG/1
TABLET ORAL
Qty: 110 TABLET | Refills: 1 | Status: SHIPPED | OUTPATIENT
Start: 2022-10-20

## 2022-10-20 NOTE — TELEPHONE ENCOUNTER
ANTICOAGULATION MANAGEMENT:  Medication Refill    Anticoagulation Summary  As of 9/22/2022    Warfarin maintenance plan:  5 mg (2.5 mg x 2) every Sun; 2.5 mg (2.5 mg x 1) all other days; Starting 9/22/2022   Next INR check:  10/20/2022   Target end date:  1/1/2070    Indications    Personal history of DVT (deep vein thrombosis) [Z86.718]  History of pulmonary embolism [Z86.711]             Anticoagulation Care Providers     Provider Role Specialty Phone number    Odilon Corral MD Referring Family Medicine [phone]          Visit with referring provider/group within last year: Yes    [facility] referral signed within last year: Yes    Vitor meets all criteria for refill (current [facility] referral, office visit with referring provider/group in last year, lab monitoring up to date or not exceeding 2 weeks overdue). Rx instructions and quantity supplied updated to match patient's current dosing plan. Warfarin 90 day supply with 1 refill granted per [facility] protocol     Portia Mackey RN  Anticoagulation Clinic       Protocols require a nurse triage any musculoskeletal pain prior to scheduling.  Please reach out to patient.     Wife can be reached at [phone]

## 2022-10-27 ENCOUNTER — APPOINTMENT (OUTPATIENT)
Dept: LAB | Facility: CLINIC | Age: 57
End: 2022-10-27
Payer: COMMERCIAL

## 2022-10-27 ENCOUNTER — ANTICOAGULATION THERAPY VISIT (OUTPATIENT)
Dept: ANTICOAGULATION | Facility: CLINIC | Age: 57
End: 2022-10-27

## 2022-10-27 DIAGNOSIS — Z86.718 PERSONAL HISTORY OF DVT (DEEP VEIN THROMBOSIS): Primary | ICD-10-CM

## 2022-10-27 DIAGNOSIS — Z86.711 HISTORY OF PULMONARY EMBOLISM: ICD-10-CM

## 2022-10-27 LAB — INR BLD: 2 (ref 0.9–1.1)

## 2022-10-27 PROCEDURE — 36415 COLL VENOUS BLD VENIPUNCTURE: CPT

## 2022-10-27 PROCEDURE — 85610 PROTHROMBIN TIME: CPT

## 2022-10-27 NOTE — PROGRESS NOTES
ANTICOAGULATION MANAGEMENT     Vitor Maldonado 56 year old male is on warfarin with therapeutic INR result. (Goal INR 2.0-3.0)    Recent labs: (last 7 days)     10/27/22  1546   INR 2.0*       ASSESSMENT       Source(s): Chart Review    Previous INR was Therapeutic last visit; previously outside of goal range    Medication, diet, health changes since last INR chart reviewed; none identified           PLAN     Recommended plan for no diet, medication or health factor changes affecting INR     Dosing Instructions: Continue your current warfarin dose with next INR in 5 weeks       Summary  As of 10/27/2022    Full warfarin instructions:  5 mg every Sun; 2.5 mg all other days; Starting 10/27/2022   Next INR check:  12/1/2022             Detailed voice message left for Iftikhar with dosing instructions and follow up date.     Contact [phone]  to schedule and with any changes, questions or concerns.     Education provided:     Please call back if any changes to your diet, medications or how you've been taking warfarin    Plan made per [facility] anticoagulation protocol    Vanita Perez RN  Anticoagulation Clinic  10/27/2022    _______________________________________________________________________     Anticoagulation Episode Summary     Current INR goal:  2.0-3.0   TTR:  63.4 % (1 y)   Target end date:  1/1/2070   Send INR reminders to:  JESSICA TAPIA    Indications    Personal history of DVT (deep vein thrombosis) [Z86.718]  History of pulmonary embolism [Z86.711]           Comments:           Anticoagulation Care Providers     Provider Role Specialty Phone number    Odilon Corral MD Referring Family Medicine [phone]

## 2022-11-14 ENCOUNTER — NURSE TRIAGE (OUTPATIENT)
Dept: FAMILY MEDICINE | Facility: CLINIC | Age: 57
End: 2022-11-14

## 2022-11-14 NOTE — TELEPHONE ENCOUNTER
"  Patient Returning Call    Reason for call:  Continuing to have that odd feeling in chest, as discussed with Dr Corral back in October at recent visit, patient was told from cardiology RN to mention a \"rapid access test\" or \"rapid access clinic\" to PCP and see what to do from here. Patient is unsure if he needs another appt with PCP or if he just needs a referral, however he states the symptom won't go away and would like to look into It further.\"Chest feels raw when being active\"    Information relayed to patient:  Will send to care team and to expect a call back     Patient has additional questions:  Yes    What are your questions/concerns:  Where to go from here, what else to expect or look into    Okay to leave a detailed message?: Yes at Cell number on file:    Telephone Information:   Mobile [phone]       "

## 2022-11-14 NOTE — TELEPHONE ENCOUNTER
Called patient. Left voice message to return call at [phone].    Annabella Cat RN   [facility]

## 2022-11-14 NOTE — TELEPHONE ENCOUNTER
"Second Level Triage Required    Spoke with patient regarding symptoms.     Patient describing he has been experiencing chest pressure and pain in his upper part of his chest. Patient describes it as a \"raw\" pain. Patient has been experiencing symptom for the past 2.5 months. Patient describes that the pressure comes and goes. Chest pressure is exacerbated with exercising. Once patient relaxes, pressure is relieved. Chest pain lasts for a few minutes. Patient rates 5/10 pain when exercising.     Patient had an office visit with PCP on 10/18, and had mentioned this same symptom to provider. Provider advised he follow-up if symptoms persist. Patient does not see a cardiologist, and is hoping for a referral to further evaluate.    Patient denies history of heart or lung disease. Patient states he feels dizzy and short of breath when he has the chest pressure, otherwise denies additional symptoms. Patient states his symptom has been consistent, not improving or worsening.    Per protocol, patient should be seen in office today. Patient requesting to see PCP. Patient requesting to follow-up with PCP in clinic rather than UC/ED, as PCP had seen patient in past for symptom in previous visit. Assisted in booking soonest available appointment on 11/28 at 0800.    Advised patient should be seen immediately in UC/ED if symptoms worsen. Patient verbalized understanding and has no further questions at this time.    Routing to provider to please advise if appropriate to wait until 11/28 appointment to be seen.     Ilsa Grimes RN  [facility]    Reason for Disposition    Chest pain(s) lasting a few seconds persists > 3 days    Additional Information    Negative: SEVERE difficulty breathing (e.g., struggling for each breath, speaks in single words)    Negative: Passed out (i.e., fainted, collapsed and was not responding)    Negative: Difficult to awaken or acting confused (e.g., disoriented, slurred " speech)    Negative: Shock suspected (e.g., cold/pale/clammy skin, too weak to stand, low BP, rapid pulse)    Negative: Chest pain lasting longer than 5 minutes and ANY of the following:* Over 44 years old* Over 30 years old and at least one cardiac risk factor (e.g., diabetes mellitus, high blood pressure, high cholesterol, smoker, or strong family history of heart disease)* History of heart disease (i.e., angina, heart attack, heart failure, bypass surgery, takes nitroglycerin)* Pain is crushing, pressure-like, or heavy    Negative: Heart beating < 50 beats per minute OR > 140 beats per minute    Negative: Visible sweat on face or sweat dripping down face    Negative: Sounds like a life-threatening emergency to the triager    Negative: Followed an injury to chest    Negative: SEVERE chest pain    Negative: Pain also in shoulder(s) or arm(s) or jaw    Negative: Difficulty breathing    Negative: Cocaine use within last 3 days    Negative: Major surgery in the past month    Negative: Hip or leg fracture (broken bone) in past month (or had cast on leg or ankle in past month)    Negative: Illness requiring prolonged bedrest in past month (e.g., immobilization, long hospital stay)    Negative: Long-distance travel in past month (e.g., car, bus, train, plane; with trip lasting 6 or more hours)    Negative: History of prior 'blood clot' in leg or lungs (i.e., deep vein thrombosis, pulmonary embolism)    Negative: History of inherited increased risk of blood clots (e.g., Factor 5 Leiden, Anti-thrombin 3, Protein C or Protein S deficiency, Prothrombin mutation)    Negative: Cancer treatment in the past two months (or has cancer now)    Negative: Heart beating irregularly or very rapidly    Negative: Chest pain lasting longer than 5 minutes and occurred in last 3 days (72 hours) (Exception: feels exactly the same as previously diagnosed heartburn and has accompanying sour taste in mouth)    Negative: Chest pain or 'angina'  comes and goes and is happening more often (increasing in frequency) or getting worse (increasing in severity) (Exception: chest pains that last only a few seconds)    Negative: Dizziness or lightheadedness    Negative: Coughing up blood    Negative: Patient sounds very sick or weak to the triager    Negative: Patient says chest pain feels exactly the same as previously diagnosed 'heartburn' and describes burning in chest and accompanying sour taste in mouth    Negative: Fever > 100.4 F (38.0 C)    Protocols used: CHEST PAIN-A-[place]

## 2022-11-15 ENCOUNTER — OFFICE VISIT (OUTPATIENT)
Dept: URGENT CARE | Facility: URGENT CARE | Age: 57
End: 2022-11-15
Payer: COMMERCIAL

## 2022-11-15 VITALS
TEMPERATURE: 97.2 F | HEART RATE: 61 BPM | RESPIRATION RATE: 18 BRPM | OXYGEN SATURATION: 99 % | SYSTOLIC BLOOD PRESSURE: 182 MMHG | DIASTOLIC BLOOD PRESSURE: 100 MMHG

## 2022-11-15 DIAGNOSIS — R07.9 CHEST PAIN, UNSPECIFIED TYPE: Primary | ICD-10-CM

## 2022-11-15 PROCEDURE — 99214 OFFICE O/P EST MOD 30 MIN: CPT | Performed by: PHYSICIAN ASSISTANT

## 2022-11-15 PROCEDURE — 93000 ELECTROCARDIOGRAM COMPLETE: CPT | Performed by: PHYSICIAN ASSISTANT

## 2022-11-15 NOTE — PATIENT INSTRUCTIONS
Suspect cardiac related pain. Lower suspicion for PE. Please complete workup at Emergency Department.

## 2022-11-15 NOTE — TELEPHONE ENCOUNTER
It would be preferable to see him sooner, but I do not have any openings until the 28th, so we could just go with that for now.  He should probably take it easy with physical activity between now and then.  If he starts having chest discomfort at rest that is suggestive of angina, instead of just with physical activity, then he should be seen sooner (perhaps in an urgent care or ER setting).    Odilon Corral MD

## 2022-11-15 NOTE — TELEPHONE ENCOUNTER
Left message on voicemail advising patient to return call at [phone].    Christina COSTAN, RN  [facility], [place]

## 2022-11-15 NOTE — TELEPHONE ENCOUNTER
Spoke with patient and gave PCPs message.     Discussed symptoms again with patient, ultimately patient decided to be seen at  today.     Delmi Esposito RN  [facility]

## 2022-11-15 NOTE — PROGRESS NOTES
Patient presents with:  Urgent Care  Chest Pain: Per patient has been having chest center pain for a couple of months did inform his provider on his last visit and was informed to schedule appointment if it was more noticeable but riccardo is not until the end of the month feels pain is more noticeable       Clinical Decision Making:  Strong fam hx of MI. Current symptoms concerning for cardiac involvement given worsened with activity and associated with SOB. EKG is looking stable with no signs of ischemic changes. Recommend patient have more complete workup at ED to rule out MI. Patient agreeable to plan.       ICD-10-CM    1. Chest pain, unspecified type  R07.9 EKG 12-lead, tracing only          Patient Instructions   1. Suspect cardiac related pain. Lower suspicion for PE. Please complete workup at Emergency Department.       HPI:  Vitor Maldonado is a 57 year old male who presents today complaining of chest pain for 2 months. Worsened by activity. Worsened yesterday while shoveling snow. Family hx of MI by father starting in 30s x 4 leading to death and mother had one in her 90s. Patient also has personal hx of DVT and PE and is on Warfarin. Typically his BP is more controlled than it is today. He admits to feeling nervous. When the pain comes on it is associated with SOB. He has not had any leg pain or swelling or sudden weight gain. Patient currently has mild pain.     History obtained from the patient.    Problem List:  2022-06: Hyperlipidemia LDL goal <100  2022-06: Overweight (BMI 25.0-29.9)  2021-04: Personal history of DVT (deep vein thrombosis)  2021-04: History of pulmonary embolism  2021-04: History of colonic polyps      Past Medical History:   Diagnosis Date     History of colonic polyps      History of pulmonary embolism 03/2017    In 3/17 -- need for lifelong anticoagulation     Personal history of DVT (deep vein thrombosis) 08/2015    Left leg in 8/15       Social History     Tobacco Use     Smoking  status: Never     Smokeless tobacco: Never   Substance Use Topics     Alcohol use: Yes       Review of Systems    Vitals:    11/15/22 1015   BP: (!) 182/100   Pulse: 61   Resp: 18   Temp: 97.2  F (36.2  C)   TempSrc: Tympanic   SpO2: 99%       Physical Exam  Vitals and nursing note reviewed.   Constitutional:       General: He is not in acute distress.     Appearance: He is not toxic-appearing or diaphoretic.   HENT:      Head: Normocephalic and atraumatic.      Right Ear: External ear normal.      Left Ear: External ear normal.   Eyes:      Conjunctiva/sclera: Conjunctivae normal.   Pulmonary:      Effort: Pulmonary effort is normal. No respiratory distress.   Neurological:      Mental Status: He is alert.   Psychiatric:         Mood and Affect: Mood normal.         Behavior: Behavior normal.         Thought Content: Thought content normal.         Judgment: Judgment normal.         Results:  No results found for any visits on 11/15/22.      At the end of the encounter, I discussed results, diagnosis, medications. Discussed red flags for immediate return to clinic/ER, as well as indications for follow up if no improvement. Patient understood and agreed to plan. Patient was stable for discharge.

## 2022-12-08 ENCOUNTER — TELEPHONE (OUTPATIENT)
Dept: ANTICOAGULATION | Facility: CLINIC | Age: 57
End: 2022-12-08

## 2022-12-08 ENCOUNTER — TELEPHONE (OUTPATIENT)
Dept: FAMILY MEDICINE | Facility: CLINIC | Age: 57
End: 2022-12-08

## 2022-12-08 DIAGNOSIS — Z86.711 HISTORY OF PULMONARY EMBOLISM: ICD-10-CM

## 2022-12-08 DIAGNOSIS — Z86.718 PERSONAL HISTORY OF DVT (DEEP VEIN THROMBOSIS): Primary | ICD-10-CM

## 2022-12-08 NOTE — TELEPHONE ENCOUNTER
Reason for Call:  INR    Who is calling?  Patient     Phone number:  [phone]    Fax number:      Name of caller: Vitor Maldonado     INR Value:      Are there any other concerns:  Yes: Returning call he is now going to [facility]. INR clinic     Can we leave a detailed message on this number? YES    Phone number patient can be reached at: Cell number on file:    Telephone Information:   Mobile [phone]         Call taken on 12/8/2022 at 11:08 AM by Nancy Maria

## 2022-12-08 NOTE — TELEPHONE ENCOUNTER
ANTICOAGULATION  MANAGEMENT    Vitor Maldonado is being discharged from the [facility] Anticoagulation Management Program ([facility]).    Reason for discharge: care has been transferred to [facility]    Anticoagulation episode resolved, [facility] referral closed and Standing order discontinued    If patient needs warfarin management in the future, please send a new referral    Portia Mackey RN